# Patient Record
Sex: FEMALE | Race: WHITE | NOT HISPANIC OR LATINO | Employment: OTHER | URBAN - METROPOLITAN AREA
[De-identification: names, ages, dates, MRNs, and addresses within clinical notes are randomized per-mention and may not be internally consistent; named-entity substitution may affect disease eponyms.]

---

## 2017-01-02 ENCOUNTER — GENERIC CONVERSION - ENCOUNTER (OUTPATIENT)
Dept: OTHER | Facility: OTHER | Age: 77
End: 2017-01-02

## 2017-07-03 ENCOUNTER — GENERIC CONVERSION - ENCOUNTER (OUTPATIENT)
Dept: OTHER | Facility: OTHER | Age: 77
End: 2017-07-03

## 2017-07-10 ENCOUNTER — GENERIC CONVERSION - ENCOUNTER (OUTPATIENT)
Dept: OTHER | Facility: OTHER | Age: 77
End: 2017-07-10

## 2017-07-31 ENCOUNTER — GENERIC CONVERSION - ENCOUNTER (OUTPATIENT)
Dept: OTHER | Facility: OTHER | Age: 77
End: 2017-07-31

## 2017-08-04 ENCOUNTER — GENERIC CONVERSION - ENCOUNTER (OUTPATIENT)
Dept: OTHER | Facility: OTHER | Age: 77
End: 2017-08-04

## 2017-08-09 ENCOUNTER — ALLSCRIPTS OFFICE VISIT (OUTPATIENT)
Dept: OTHER | Facility: OTHER | Age: 77
End: 2017-08-09

## 2017-08-09 DIAGNOSIS — I10 ESSENTIAL (PRIMARY) HYPERTENSION: ICD-10-CM

## 2017-08-09 DIAGNOSIS — I42.0 DILATED CARDIOMYOPATHY (HCC): ICD-10-CM

## 2017-08-09 DIAGNOSIS — E78.00 PURE HYPERCHOLESTEROLEMIA: ICD-10-CM

## 2017-08-09 DIAGNOSIS — C90.00 MULTIPLE MYELOMA NOT HAVING ACHIEVED REMISSION (HCC): ICD-10-CM

## 2017-08-09 DIAGNOSIS — M81.0 AGE-RELATED OSTEOPOROSIS WITHOUT CURRENT PATHOLOGICAL FRACTURE: ICD-10-CM

## 2017-08-24 ENCOUNTER — ALLSCRIPTS OFFICE VISIT (OUTPATIENT)
Dept: OTHER | Facility: OTHER | Age: 77
End: 2017-08-24

## 2017-08-31 ENCOUNTER — GENERIC CONVERSION - ENCOUNTER (OUTPATIENT)
Dept: OTHER | Facility: OTHER | Age: 77
End: 2017-08-31

## 2017-09-06 ENCOUNTER — GENERIC CONVERSION - ENCOUNTER (OUTPATIENT)
Dept: OTHER | Facility: OTHER | Age: 77
End: 2017-09-06

## 2017-09-11 ENCOUNTER — GENERIC CONVERSION - ENCOUNTER (OUTPATIENT)
Dept: OTHER | Facility: OTHER | Age: 77
End: 2017-09-11

## 2017-10-17 ENCOUNTER — ALLSCRIPTS OFFICE VISIT (OUTPATIENT)
Dept: OTHER | Facility: OTHER | Age: 77
End: 2017-10-17

## 2018-01-10 NOTE — PROGRESS NOTES
History of Present Illness  Care Coordination Encounter Information:   Type of Encounter: Telephonic   Contact: Initial Contact    Spoke to Patient  Care Coordination SL Nurse [facility]:   The reason for call is to discuss outreach for follow up/needed services  Spoke with pt and she does decline further outreach  She had been inpt at [facility]  She is very knowledgeable about her various diagnoses and sees many specialists such as Oral Surgery, Hem Onc, ID, has VNA, PCP, etc  She is actively involved in her care  She is very pleasant and is currently infusing Antibiotics 3x a day through PICC for osteomyelitis as well as continues chemo for her multiple myeloma  Active Problems    1  Actinic keratoses (702 0) (L57 0)   2  Benign essential hypertension (401 1) (I10)   3  Cellulitis, face (682 0) (L03 211)   4  Chronic systolic heart failure, ACC/AHA stage C (428 22) (I50 22)   5  Dilated cardiomyopathy (425 4) (I42 0)   6  Encounter for current long-term use of anticoagulants (V58 61) (Z79 01)   7  Hypercholesterolemia (272 0) (E78 00)   8  Multiple myeloma (203 00) (C90 00)   9  TRACY (obstructive sleep apnea) (327 23) (G47 33)   10  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)   11  Osteoporosis (733 00) (M81 0)   12  Pulmonary embolism (415 19) (I26 99)    Past Medical History    1  History of Abdominal mass, RUQ (right upper quadrant) (789 31) (R19 01)   2  History of Abnormal electrocardiogram (794 31) (R94 31)   3  History of Acute gastric ulcer with hemorrhage (531 00) (K25 0)   4  History of Acute maxillary sinusitis (461 0) (J01 00)   5  History of Acute venous embolism and thrombosis of deep vessels of distal lower   extremity (453 42) (I82 4Z9)   6  History of Adenocarcinoma Of The Breast (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 3)   7  History of Breast Cancer (V10 3)   8  Denied: History of Carrier Of STD   9  History of Closed Fracture Of Two Left Ribs (807 02)   10  History of Depression screening (V79 0) (Z13 89)   11   History of Dyspnea (786 09) (R06 00)   12  History of Encounter for screening colonoscopy (V76 51) (Z12 11)   13  History of Encounter for screening mammogram for malignant neoplasm of breast    (V76 12) (Z12 31)   14  History of Glucose Intolerance (271 3)   15  History of Hematemesis/vomiting blood (578 0) (K92 0)   16  History of basal cell carcinoma (V10 83) (Z85 828)   17  History of congestive heart failure (V12 59) (Z86 79)   18  History of edema (V13 89) (Z87 898)   19  History of fatigue (V13 89) (Z87 898)   20  History of malignant neoplasm of skin (V10 83) (Z85 828)   21  History of type 2 diabetes mellitus (V12 29) (Z86 39)   22  History of Iatrogenic Pulmonary Embolism (415 11)   23  History of Lymphedema (457 1) (I89 0)   24  History of Mammogram abnormal (793 80) (R92 8)   25  History of Mental status change (780 97) (R41 82)   26  History of Need for prophylactic vaccination and inoculation against influenza (V04 81)    (Z23)   27  History of Obstructive sleep apnea (327 23) (G47 33)   28  History of Osteoarthritis of hip (715 95) (M16 9)   29  History of Osteoarthritis of knee (715 36) (M17 10)   30  History of Pulmonary Embolism (V12 55)   31  History of S/P IVC filter (V45 89) (Z95 828)   32  History of Skin Cancer (V10 83)   33  History of Tachycardia (785 0) (R00 0)   34  History of Visit For: Malig Neoplasm Vagina Post-hysterectomy Pap Smear (V67 01)   35  History of Vitamin D deficiency (268 9) (E55 9)    Surgical History    1  History of Biopsy Bone Marrow   2  History of Breast Surgery   3  History of Dilation And Curettage   4  History of Dilation And Curettage   5  History of Knee Surgery   6  History of Lymphadenectomy   7  History of Reported Hx Of Hip Replacement    Family History  Mother    1  Family history of No known health problems  Father    2  Family history of No known health problems  Brother    3  Family history of Hypertension (V17 49)  Maternal Aunt    4   Family history of Breast Cancer (V16 3)    Social History    · Denied: History of Alcohol Use (History)   · Daily Coffee Consumption (1  Cups/Day)   · Denied: History of Drug Use   · Denied: History of Exercise Habits   · Marital History - Currently    · Never A Smoker   · Occasional alcohol use   · Occupation: Retired    Current Meds    1  Lisinopril 5 MG Oral Tablet; take 1 tablet daily at bedtime; Last Rx:12Jan2015 Ordered    2  Simvastatin 40 MG Oral Tablet; TAKE 1 TABLET DAILY  Requested for: 44GLC4858; Last   Rx:46Bjs2955 Ordered    3  Dexamethasone 4 MG Oral Tablet; 5 tablets weekly on cho day; Therapy: 40Unr5279 to Recorded   4  Zometa 4 MG/100ML Intravenous Solution (Zoledronic Acid); monthly; Therapy: (Recorded:24Gzt4779) to Recorded    5  Carvedilol 6 25 MG Oral Tablet; TAKE 1 TABLET TWICE DAILY WITH MEALS  Requested   for: 11KVP1584; Last Rx:54Jzj1624 Ordered   6  Klor-Con M20 20 MEQ Oral Tablet Extended Release; TAKE 1 TABLET DAILY  Requested   for: 91IPO3398; Last Rx:87Lcy9362 Ordered    7  Vitamin D3 08413 UNIT Oral Capsule; Take 1 capsule, 1 x per week, for 12 weeks; Therapy: 34Joo3760 to (Last Rx:07Osr0301)  Requested for: 74NIN7171 Ordered    8  Eliquis 5 MG Oral Tablet; Take 1 tablet twice daily; Therapy: 93PBZ3564 to (Evaluate:16Mar2017)  Requested for: 97DSV7037; Last   Rx:80Jxi3994 Ordered    9  Acyclovir 400 MG Oral Tablet; Take 1 tablet daily; Therapy: 46LTL6254 to (03 17 74 30 53) Recorded   10  Famotidine 20 MG Oral Tablet; 1 tablet daily if needed; Therapy: 29NID0917 to Recorded   11  Furosemide 20 MG Oral Tablet; TAKE 1 TABLET DAILY AS DIRECTED; Therapy: (Recorded:50Ucg5956) to Recorded   12  LORazepam 0 5 MG Oral Tablet; TAKE 1 TABLET EVERY 6 HOURS AS NEEDED FOR    NAUSEA; Therapy: (Recorded:06Oct2015) to Recorded   13  Ondansetron 8 MG Oral Tablet Disintegrating; Therapy: 12Eci6754 to Recorded   14  Prochlorperazine Maleate 10 MG Oral Tablet; Therapy: 93Oaj6359 to Recorded   15  Revlimid 15 MG Oral Capsule; TAKE 1 CAPSULE DAILY; Therapy: 24JQH0281 to Recorded   16  Velcade 3 5 MG Injection Solution Reconstituted; weekly; Therapy: (Recorded:10Nov2016) to Recorded    Allergies    1  Latex Gloves MISC    2  Latex   3  Adhesive Tape    Health Management   *VB - Fall Risk Assessment  (Dx Z13 89 Screen for Neurologic Disorder); every 1 year; Last  82Vtt8882; Next Due: 85Dhj1512; Overdue   *VB - Urinary Incontinence Screen (Dx Z13 89 Screen for UI); every 1 year; Last 07Lht3854; Next  Due: 39Agl9188; Overdue  *VB-Depression Screening; every 1 year; Last 25Npl6044; Next Due: 15Sgt0097; Overdue   *VB - Fall Risk Assessment  (Dx Z13 89 Screen for Neurologic Disorder); every 1 year; Last  38Cvb8996; Next Due: 96Fes2001; Overdue   (1) BASIC METABOLIC PROFILE; every 3 months; Last 41ADK7547; Next Due: 01Ulx8047; Overdue  (1) HEMOGLOBIN A1C; every 3 months; Last 54EDI7452; Next Due: 35CTO7299; Overdue  (1) MICROALBUMIN CREATININE RATIO, RANDOM URINE; every 1 year; Next Due: 97YQH8936; Overdue  *VB - Eye Exam; every 1 year; Next Due: 75RUA8010; Overdue  *VB - Foot Exam; every 1 year; Last 95NUU3852; Next Due: U3819850; Overdue    End of Encounter Meds    1  Lisinopril 5 MG Oral Tablet; take 1 tablet daily at bedtime; Last Rx:12Jan2015 Ordered    2  Simvastatin 40 MG Oral Tablet; TAKE 1 TABLET DAILY  Requested for: 51XKC3112; Last   Rx:02Feb2016 Ordered    3  Dexamethasone 4 MG Oral Tablet; 5 tablets weekly on cho day; Therapy: 92Sbh0138 to Recorded   4  Zometa 4 MG/100ML Intravenous Solution (Zoledronic Acid); monthly; Therapy: (Recorded:66Avl5650) to Recorded    5  Carvedilol 6 25 MG Oral Tablet; TAKE 1 TABLET TWICE DAILY WITH MEALS  Requested   for: 02CQH9898; Last Rx:02Feb2016 Ordered   6  Klor-Con M20 20 MEQ Oral Tablet Extended Release; TAKE 1 TABLET DAILY  Requested   for: 57AAY2548; Last Rx:47Ftk9995 Ordered    7  Vitamin D3 20802 UNIT Oral Capsule;  Take 1 capsule, 1 x per week, for 12 weeks; Therapy: 03Cxp1155 to (Last Rx:53Btj9529)  Requested for: 08QXP3938 Ordered    8  Eliquis 5 MG Oral Tablet; Take 1 tablet twice daily; Therapy: 23PRE0766 to (Evaluate:16Mar2017)  Requested for: 01KNX6805; Last   Rx:05Qfa1811 Ordered    9  Acyclovir 400 MG Oral Tablet; Take 1 tablet daily; Therapy: 50VUK3088 to (566 324 313) Recorded   10  Famotidine 20 MG Oral Tablet; 1 tablet daily if needed; Therapy: 59EQT6664 to Recorded   11  Furosemide 20 MG Oral Tablet; TAKE 1 TABLET DAILY AS DIRECTED; Therapy: (Recorded:62Bwp2702) to Recorded   12  LORazepam 0 5 MG Oral Tablet; TAKE 1 TABLET EVERY 6 HOURS AS NEEDED FOR    NAUSEA; Therapy: (Recorded:28Ohu4863) to Recorded   13  Ondansetron 8 MG Oral Tablet Disintegrating; Therapy: 20Ytc7180 to Recorded   14  Prochlorperazine Maleate 10 MG Oral Tablet; Therapy: 98Vuz4085 to Recorded   15  Revlimid 15 MG Oral Capsule; TAKE 1 CAPSULE DAILY; Therapy: 90IJO1542 to Recorded   16  Velcade 3 5 MG Injection Solution Reconstituted; weekly;     Therapy: (Recorded:54Ayy2606) to Recorded    Future Appointments    Date/Time Provider Specialty Site   08/09/2017 03:00 PM Senthil Brice MD Internal Medicine [facility] AND [facility] 90     Message   Recorded as Task   Date: 08/03/2017 09:41 AM, Created By: Joseph Snyder   Task Name: Care Coordination   Assigned To: Joseph Snyder   Regarding Patient: Ashlee Pemberton, Status: Active   [place] [address] Aug 2017 9:41 AM     TASK CREATED  Left a message for call back for initial outreach   [facility] Aug 2017 10:52 AM     TASK EDITED  Left a second message for outreach     Patient Care Team    Care Team Member Role Specialty Office Number   [address] [place] [address] [address]  Hematology Oncology [phone]     Signatures   Electronically signed by : Levonne Libman, RN; Aug  4 2017  4:50PM EST                       (Author)

## 2018-01-10 NOTE — MISCELLANEOUS
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, C-diff colitis, multiple myeloma, DVT of bilat LEs, current long term use of anticoagulants, acute osteomyelitis of jaw  She was discharged to a rehabilitation center  Medications were not reviewed today  She did not schedule a follow up appointment  Communication performed and completed by kb      Active Problems    1  Actinic keratoses (702 0) (L57 0)   2  Anemia (285 9) (D64 9)   3  Benign essential hypertension (401 1) (I10)   4  C  difficile colitis (008 45) (A04 7)   5  Cerebrovascular accident (CVA) due to embolism of right middle cerebral artery (434 11)   (I63 411)   6  Chronic systolic heart failure, ACC/AHA stage C (428 22) (I50 22)   7  Dilated congestive cardiomyopathy (425 4) (I42 0)   8  DVT of lower extremity, bilateral (453 40) (I82 403)   9  Encounter for current long-term use of anticoagulants (V58 61) (Z79 01)   10  Encounter for special screening examination for genitourinary disorder (V81 6) (Z13 89)   11  Hypercholesterolemia (272 0) (E78 00)   12  Hypokalemia (276 8) (E87 6)   13  Multiple myeloma (203 00) (C90 00)   14  TRACY (obstructive sleep apnea) (327 23) (G47 33)   15  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)   16  Osteoporosis (733 00) (M81 0)   17  Pulmonary embolism (415 19) (I26 99)   18  TIA (transient ischemic attack) (435 9) (G45 9)    Past Medical History    1  History of Abdominal mass, RUQ (right upper quadrant) (789 31) (R19 01)   2  History of Abnormal electrocardiogram (794 31) (R94 31)   3  History of Acute gastric ulcer with hemorrhage (531 00) (K25 0)   4   History of Acute maxillary sinusitis (461 0) (J01 00)   5  History of Acute venous embolism and thrombosis of deep vessels of distal lower   extremity (453 42) (I82 4Z9)   6  History of Adenocarcinoma Of The Breast (V10 3)   7  History of Breast Cancer (V10 3)   8  Denied: History of Carrier Of STD   9  History of Cellulitis, face (682 0) (L03 211)   10  History of Closed Fracture Of Two Left Ribs (807 02)   11  History of Depression screening (V79 0) (Z13 89)   12  History of Dyspnea (786 09) (R06 00)   13  History of Encounter for screening colonoscopy (V76 51) (Z12 11)   14  History of Encounter for screening mammogram for malignant neoplasm of breast    (V76 12) (Z12 31)   15  History of Glucose Intolerance (271 3)   16  History of Hematemesis/vomiting blood (578 0) (K92 0)   17  History of basal cell carcinoma (V10 83) (Z85 828)   18  History of congestive heart failure (V12 59) (Z86 79)   19  History of edema (V13 89) (Z87 898)   20  History of fatigue (V13 89) (Z87 898)   21  History of malignant neoplasm of skin (V10 83) (Z85 828)   22  History of type 2 diabetes mellitus (V12 29) (Z86 39)   23  History of Iatrogenic Pulmonary Embolism (415 11)   24  History of Lymphedema (457 1) (I89 0)   25  History of Mammogram abnormal (793 80) (R92 8)   26  History of Mental status change (780 97) (R41 82)   27  History of Need for prophylactic vaccination and inoculation against influenza (V04 81)    (Z23)   28  History of Obstructive sleep apnea (327 23) (G47 33)   29  History of Osteoarthritis of hip (715 95) (M16 9)   30  History of Osteoarthritis of knee (715 36) (M17 10)   31  History of Pulmonary Embolism (V12 55)   32  History of S/P IVC filter (V45 89) (Z95 828)   33  History of Skin Cancer (V10 83)   34  History of Tachycardia (785 0) (R00 0)   35  History of Visit For: Malig Neoplasm Vagina Post-hysterectomy Pap Smear (V67 01)   36  History of Vitamin D deficiency (268 9) (E55 9)    Surgical History    1   History of Biopsy Bone Marrow   2  History of Breast Surgery   3  History of Dilation And Curettage   4  History of Dilation And Curettage   5  History of Knee Surgery   6  History of Lymphadenectomy   7  History of Reported Hx Of Hip Replacement    Family History  Mother    1  Family history of No known health problems  Father    2  Family history of No known health problems  Brother    3  Family history of Hypertension (V17 49)  Maternal Aunt    4  Family history of Breast Cancer (V16 3)    Social History    · Denied: History of Alcohol Use (History)   · Daily Coffee Consumption (1  Cups/Day)   · Denied: History of Drug Use   · Denied: History of Exercise Habits   · Marital History - Currently    · Never A Smoker   · Occasional alcohol use   · Occupation: Retired    Current Meds   1  Acyclovir 400 MG Oral Tablet; Take 1 tablet daily; Therapy: 74NVV0406 to (Evaluate:21Oct2015) Recorded   2  Carvedilol 6 25 MG Oral Tablet; TAKE 1 TABLET TWICE DAILY WITH MEALS  Requested   for: 29GQE6612; Last Rx:13Aep8803 Ordered   3  Dexamethasone 4 MG Oral Tablet; 5 tablets weekly on cho day; Therapy: 25Dbq7634 to Recorded   4  Eliquis 5 MG Oral Tablet; Take 1 tablet twice daily; Therapy: 75DJE4944 to (Evaluate:16Mar2017)  Requested for: 71NBB4952; Last   Rx:34Hpm8759 Ordered   5  Famotidine 20 MG Oral Tablet; 1 tablet daily if needed; Therapy: 71Ffg8757 to Recorded   6  Furosemide 20 MG Oral Tablet; TAKE 1 TABLET DAILY AS DIRECTED; Therapy: (Recorded:42Ems7775) to Recorded   7  Klor-Con M20 20 MEQ Oral Tablet Extended Release; TAKE 1 TABLET DAILY  Requested   for: 48GTQ6416; Last Rx:69Ykx1204 Ordered   8  Lisinopril 5 MG Oral Tablet; take 1 tablet daily at bedtime; Last Rx:12Jan2015 Ordered   9  LORazepam 0 5 MG Oral Tablet; TAKE 1 TABLET EVERY 6 HOURS AS NEEDED FOR   NAUSEA; Therapy: (Recorded:06Oct2015) to Recorded   10  Ondansetron 8 MG Oral Tablet Disintegrating; Therapy: 15Sep2015 to Recorded   11   Prochlorperazine Maleate 10 MG Oral Tablet; Therapy: 74Rgq8558 to Recorded   12  Revlimid 15 MG Oral Capsule; TAKE 1 CAPSULE DAILY; Therapy: 40NXE4514 to Recorded   13  Simvastatin 40 MG Oral Tablet; TAKE 1 TABLET DAILY  Requested for: 19BRS7579; Last    Rx:83Wjd9066 Ordered   14  Velcade 3 5 MG Injection Solution Reconstituted; weekly; Therapy: (Recorded:70Ohv1037) to Recorded   15  Vitamin D3 16488 UNIT Oral Capsule; Take 1 capsule, 1 x per week, for 12 weeks; Therapy: 47Vnn0535 to (Last Rx:08Qqa4701)  Requested for: 05ZFQ2375 Ordered    Allergies    1  Latex Gloves MISC    2  Latex   3  Adhesive Tape    Results/Data  ECG 12-LEAD 69Ozl5868 12:00AM      Test Name Result Flag Reference   ECG 12-LEAD 8/31/17         Health Management  History of Abdominal mass, RUQ (right upper quadrant)   *VB - Fall Risk Assessment  (Dx Z13 89 Screen for Neurologic Disorder); every 1 year; Last  74Dtt3516; Next Due: 38Nyc4758; Overdue  History of Depression screening   *VB - Urinary Incontinence Screen (Dx Z13 89 Screen for UI); every 1 year; Last 30Bns8737; Next  Due: 97Fhp5282; Active  *VB-Depression Screening; every 1 year; Last 77Imo6123; Next Due: 53Qmk5987; Overdue  History of Encounter for special screening examination for nervous system disorder   *VB - Fall Risk Assessment  (Dx Z13 89 Screen for Neurologic Disorder); every 1 year; Last  79Shz2656; Next Due: 88Cca1963; Overdue  History of type 2 diabetes mellitus   (1) BASIC METABOLIC PROFILE; every 3 months; Last 24Aiu3610; Next Due: 47QOQ7681; Active  (1) HEMOGLOBIN A1C; every 3 months; Last 73NPL6656; Next Due: 97KGO0906; Overdue  (1) MICROALBUMIN CREATININE RATIO, RANDOM URINE; every 1 year; Next Due: 49RXC3335; Overdue  *VB - Eye Exam; every 1 year; Next Due: 96SNK4582; Overdue  *VB - Foot Exam; every 1 year; Last 30TDJ3992; Next Due: W1483424;  Overdue    Future Appointments    Date/Time Provider Specialty Site   10/17/2017 02:15 PM Mignon Ortega DO Internal Medicine  [facility]   01/16/2018 09:30 AM Floyd Longo DO Internal Medicine [facility]     Signatures   Electronically signed by : Orestes Mir, ; Sep  6 2017  2:23PM EST                       (Co-author)    Electronically signed by : Sanjeev Camarillo DO; Sep  7 2017  9:31AM EST

## 2018-01-11 NOTE — RESULT NOTES
Verified Results  (1) VITAMIN D 25-HYDROXY 27LDD8128 07:19AM Ivette Pozo   TW Order Number: [id number]     Test Name Result Flag Reference   VIT D 25-HYDROX 39 3 ng/mL  30 0-100 0   This assay is a certified procedure of the CDC Vitamin D Standardization Certification Program (VDSCP)     Deficiency <20ng/ml   Insufficiency 20-30ng/ml   Sufficient  ng/ml     *Patients undergoing fluorescein dye angiography may retain small amounts of fluorescein in the body for 48-72 hours post procedure  Samples containing fluorescein can produce falsely elevated Vitamin D values  If the patient had this procedure, a specimen should be resubmitted post fluorescein clearance  (1) LIPID PANEL, FASTING 30UFK6105 07:19AM Jacked Order Number: [id number]     Test Name Result Flag Reference   CHOLESTEROL 194 mg/dL     HDL,DIRECT 122 mg/dL H 40-60   Specimen collection should occur prior to Metamizole administration due to the potential for falsely depressed results  LDL CHOLESTEROL CALCULATED 62 mg/dL  0-100   Triglyceride:         Normal              <150 mg/dl       Borderline High    150-199 mg/dl       High               200-499 mg/dl       Very High          >499 mg/dl  Cholesterol:         Desirable        <200 mg/dl      Borderline High  200-239 mg/dl      High             >239 mg/dl  HDL Cholesterol:        High    >59 mg/dL      Low     <41 mg/dL  LDL CALCULATED:    This screening LDL is a calculated result  It does not have the accuracy of the Direct Measured LDL in the monitoring of patients with hyperlipidemia and/or statin therapy  Direct Measure LDL (SOI125) must be ordered separately in these patients  TRIGLYCERIDES 50 mg/dL  <=150   Specimen collection should occur prior to N-Acetylcysteine or Metamizole administration due to the potential for falsely depressed results       (1) CK (CPK) 20LYT7819 07:19AM Jacked Order Number: [id number]     Test Name Result Flag Reference CK (CPK) 60 U/L       (1) NT- BNP (PRO BRAIN NATRIURETIC PEPTIDE) 05MPN5701 07:19AM Catie Holder     Test Name Result Flag Reference   NT-PRO BNP 3485 pg/mL H <450     (1) COMPREHENSIVE METABOLIC PANEL 96DXW2730 71:50HA Catie Holder   TW Order Number: [id number]     Test Name Result Flag Reference   GLUCOSE,RANDM 114 mg/dL     If the patient is fasting, the ADA then defines impaired fasting glucose as > 100 mg/dL and diabetes as > or equal to 123 mg/dL  SODIUM 141 mmol/L  136-145   POTASSIUM 4 0 mmol/L  3 5-5 3   CHLORIDE 104 mmol/L  100-108   CARBON DIOXIDE 30 mmol/L  21-32   ANION GAP (CALC) 7 mmol/L  4-13   BLOOD UREA NITROGEN 22 mg/dL  5-25   CREATININE 1 25 mg/dL  0 60-1 30   Standardized to IDMS reference method   CALCIUM 9 1 mg/dL  8 3-10 1   BILI, TOTAL 0 28 mg/dL  0 20-1 00   ALK PHOSPHATAS 60 U/L     ALT (SGPT) 17 U/L  12-78   AST(SGOT) 17 U/L  5-45   ALBUMIN 3 4 g/dL L 3 5-5 0   TOTAL PROTEIN 6 6 g/dL  6 4-8 2   eGFR Non-African American 41 6 ml/min/1 73sq m     [facility] Disease Education Program recommendations are as follows:  GFR calculation is accurate only with a steady state creatinine  Chronic Kidney disease less than 60 ml/min/1 73 sq  meters  Kidney failure less than 15 ml/min/1 73 sq  meters

## 2018-01-12 NOTE — MISCELLANEOUS
Provider Comments  Provider Comments:   Left message on machine for patient to call back to reschedule no show appointment        Signatures   Electronically signed by : Reggie Abarca DO; Oct 17 2017  3:09PM EST

## 2018-01-14 VITALS
SYSTOLIC BLOOD PRESSURE: 140 MMHG | DIASTOLIC BLOOD PRESSURE: 80 MMHG | TEMPERATURE: 97.9 F | HEART RATE: 96 BPM | HEIGHT: 58 IN | WEIGHT: 125.4 LBS | OXYGEN SATURATION: 96 % | BODY MASS INDEX: 26.32 KG/M2

## 2018-01-16 ENCOUNTER — ALLSCRIPTS OFFICE VISIT (OUTPATIENT)
Dept: OTHER | Facility: OTHER | Age: 78
End: 2018-01-16

## 2018-01-16 DIAGNOSIS — M81.0 AGE-RELATED OSTEOPOROSIS WITHOUT CURRENT PATHOLOGICAL FRACTURE: ICD-10-CM

## 2018-01-16 DIAGNOSIS — E78.00 PURE HYPERCHOLESTEROLEMIA: ICD-10-CM

## 2018-01-17 NOTE — MISCELLANEOUS
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)   3  Multiple myeloma (203 00) (C90 00)   4  Chronic systolic heart failure, ACC/AHA stage C (428 22) (I50 22)   5  Benign essential hypertension (401 1) (I10)    Discussion/Summary  Discussion Summary:   She has not seen cardio in 3 years  schedule f/up with dr Tan Peres and echo   need to f/u on Aortic valve      get Iron panel, due to repeated blood draws may need venofer  Counseling Documentation With Imm: The patient was counseled regarding  Medication SE Review and Pt Understands Tx: Possible side effects of new medications were reviewed with the patient/guardian today  The treatment plan was reviewed with the patient/guardian  The patient/guardian understands and agrees with the treatment plan      Chief Complaint  Chief Complaint Free Text Note Form: Patient is here for a SHADIA visit after hospitalization at [address] [place] [address] 7/27/2017 to 7/31/2017 for cellulitis of face and osteomyelitis of jaw  She is presently on IV antibiotics and had labs on 8/7/17 by the Giovani ROSSI  History of Present Illness  TCM Communication [facility]: The patient is being contacted for follow-up after hospitalization and SHADIA scheduled 8/9/17 w/ [facility] in [facility]  She was hospitalized at [facility]  The date of admission: 7/27/17, date of discharge: 7/31/17  Diagnosis: cellulitis of face, osteomyelitis of jaw, chronic systolic heart failure, HTN, multiple myeloma, Hx breast Ca, Hx basal cell carcinoma excision, HLD, current long term use of anticoagulants, TRACY, PICC  She was discharged to home  Medications reviewed and updated today  She scheduled a follow up appointment  The patient is currently asymptomatic  Communication performed and completed by robin Negron (Brief): The patient is being seen for follow-up of a recent hospitalization for osteomyelitis  This is located in the left  Symptoms:  swelling, redness and draining wound   The patient is currently experiencing symptoms  Associated symptoms:  malaise  Current treatment includes IV antibiotics  By report, there is fair symptom control  Multiple Myeloma: Initial presentation was 4 month(s) ago  Presentation included pathological fracture  Current diagnosis was determined by bone marrow biopsy  Past evaluation has included complete blood count  Past treatment has included bortezomib (Velcade) and radiation therapy  Cellulitis, Facial (Brief): The patient is being seen for follow-up of a recent hospitalization for facial cellulitis  Symptoms:  redness and swelling, but no localized warmth, no fever and no chills  The patient is currently experiencing symptoms  Symptoms are located in the left lower face  Onset followed a dental infection  She describes this as improving  Associated symptoms:  odynophagia  Current treatment includes IV abx  By report, there is fair symptom control  Hypertension (Follow-Up): The patient states she has been stable with her blood pressure control since the last visit  Comorbid Illnesses: cardiac failure  Interval Events: none  She has no significant interval events  Symptoms: denies impaired vision, denies dyspnea, denies chest pain, denies intermittent leg claudication and improved lower extremity edema  Associated symptoms include no headache, no focal neurologic deficits and no memory loss  Home monitoring: The patient is not checking blood pressure at home  Medications: the patient is not adherent with her medication regimen  She denies medication side effects  The patient is due for a lipid panel, a serum creatinine and an eye exam    Congestive Heart Failure (Follow-Up): The patient presents for follow-up of systolic heart failure  The patient is NYHA functional Class II  The patient states she has been stable with her heart failure symptoms since the last visit  Symptoms:   Hyperlipidemia (Follow-Up):  The patient states her hyperlipidemia has been under good control since the last visit  Comorbid Illnesses: hypertension  Interval Events: hospitalization for multiple myeloma/pathologic L tibial fracture  Symptoms:      Review of Systems  Complete-Female:   Constitutional: recent 50 lb due to chemo lb weight loss, but no fever, not feeling poorly, no chills and not feeling tired  Eyes: no eye pain, no eyesight problems and no purulent discharge from the eyes  ENT: no earache, no sore throat and no nasal discharge    The patient presents with complaints of no nosebleeds (occasionally, able to control)  Cardiovascular: lower extremity edema, but no chest pain, no intermittent leg claudication and no palpitations  Respiratory: No complaints of shortness of breath, no wheezing, no cough, no SOB on exertion, no orthopnea, no PND, no shortness of breath, no cough, no orthopnea and no wheezing  Gastrointestinal: constipation and diarrhea, but no abdominal pain, no nausea, no vomiting and no blood in stools  Genitourinary: incontinence and urge, but no dysuria  Musculoskeletal: limb swelling, but no arthralgias, no joint swelling, no limb pain and no myalgias  Integumentary: no rashes  Neurological: no headache, no numbness, no tingling, no confusion, no dizziness and no fainting  Psychiatric: sleep disturbances, but no emotional problems  Endocrine: no hot flashes  Hematologic/Lymphatic: a tendency for easy bruising, but no swollen glands and no tendency for easy bleeding  Active Problems    1  Actinic keratoses (702 0) (L57 0)   2  Benign essential hypertension (401 1) (I10)   3  Cellulitis, face (682 0) (L03 211)   4  Chronic systolic heart failure, ACC/AHA stage C (428 22) (I50 22)   5  Dilated cardiomyopathy (425 4) (I42 0)   6  Encounter for current long-term use of anticoagulants (V58 61) (Z79 01)   7  Hypercholesterolemia (272 0) (E78 00)   8  Multiple myeloma (203 00) (C90 00)   9   TRACY (obstructive sleep apnea) (327 23) (G47 33)   10  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)   11  Osteoporosis (733 00) (M81 0)   12  Pulmonary embolism (415 19) (I26 99)    Past Medical History    1  History of Abdominal mass, RUQ (right upper quadrant) (789 31) (R19 01)   2  History of Abnormal electrocardiogram (794 31) (R94 31)   3  History of Acute gastric ulcer with hemorrhage (531 00) (K25 0)   4  History of Acute maxillary sinusitis (461 0) (J01 00)   5  History of Acute venous embolism and thrombosis of deep vessels of distal lower   extremity (453 42) (I82 4Z9)   6  History of Adenocarcinoma Of The Breast (V10 3)   7  History of Breast Cancer (V10 3)   8  Denied: History of Carrier Of STD   9  History of Closed Fracture Of Two Left Ribs (807 02)   10  History of Depression screening (V79 0) (Z13 89)   11  History of Dyspnea (786 09) (R06 00)   12  History of Encounter for screening colonoscopy (V76 51) (Z12 11)   13  History of Encounter for screening mammogram for malignant neoplasm of breast    (V76 12) (Z12 31)   14  History of Glucose Intolerance (271 3)   15  History of Hematemesis/vomiting blood (578 0) (K92 0)   16  History of basal cell carcinoma (V10 83) (Z85 828)   17  History of congestive heart failure (V12 59) (Z86 79)   18  History of edema (V13 89) (Z87 898)   19  History of fatigue (V13 89) (Z87 898)   20  History of malignant neoplasm of skin (V10 83) (Z85 828)   21  History of type 2 diabetes mellitus (V12 29) (Z86 39)   22  History of Iatrogenic Pulmonary Embolism (415 11)   23  History of Lymphedema (457 1) (I89 0)   24  History of Mammogram abnormal (793 80) (R92 8)   25  History of Mental status change (780 97) (R41 82)   26  History of Need for prophylactic vaccination and inoculation against influenza (V04 81)    (Z23)   27  History of Obstructive sleep apnea (327 23) (G47 33)   28  History of Osteoarthritis of hip (715 95) (M16 9)   29  History of Osteoarthritis of knee (715 36) (M17 10)   30   History of Pulmonary Embolism (V12 55)   31  History of S/P IVC filter (V45 89) (Z95 828)   32  History of Skin Cancer (V10 83)   33  History of Tachycardia (785 0) (R00 0)   34  History of Visit For: Malig Neoplasm Vagina Post-hysterectomy Pap Smear (V67 01)   35  History of Vitamin D deficiency (268 9) (E55 9)    Surgical History    1  History of Biopsy Bone Marrow   2  History of Breast Surgery   3  History of Dilation And Curettage   4  History of Dilation And Curettage   5  History of Knee Surgery   6  History of Lymphadenectomy   7  History of Reported Hx Of Hip Replacement  Surgical History Reviewed: The surgical history was reviewed and updated today  Family History  Mother    1  Family history of No known health problems  Father    2  Family history of No known health problems  Brother    3  Family history of Hypertension (V17 49)  Maternal Aunt    4  Family history of Breast Cancer (V16 3)  Family History Reviewed: The family history was reviewed and updated today  Social History    · Denied: History of Alcohol Use (History)   · Daily Coffee Consumption (1  Cups/Day)   · Denied: History of Drug Use   · Denied: History of Exercise Habits   · Marital History - Currently    · Never A Smoker   · Occasional alcohol use   · Occupation: Retired  Social History Reviewed: The social history was reviewed and is unchanged  Current Meds   1  Acyclovir 400 MG Oral Tablet; Take 1 tablet daily; Therapy: 74ZHD5571 to (Evaluate:21Oct2015) Recorded   2  Carvedilol 6 25 MG Oral Tablet; TAKE 1 TABLET TWICE DAILY WITH MEALS  Requested   for: 74JGQ1646; Last Rx:94Fav4795 Ordered   3  Dexamethasone 4 MG Oral Tablet; 5 tablets weekly on cho day; Therapy: 22Whm0308 to Recorded   4  Eliquis 5 MG Oral Tablet; Take 1 tablet twice daily; Therapy: 41ZLC7713 to (Evaluate:16Mar2017)  Requested for: 22MYS2039; Last   Rx:36Tnk7393 Ordered   5  Famotidine 20 MG Oral Tablet; 1 tablet daily if needed;    Therapy: 09Cag3268 to Recorded   6  Furosemide 20 MG Oral Tablet; TAKE 1 TABLET DAILY AS DIRECTED; Therapy: (Recorded:57Lly5255) to Recorded   7  Klor-Con M20 20 MEQ Oral Tablet Extended Release; TAKE 1 TABLET DAILY  Requested   for: 86WOE5481; Last Rx:91Gwk3775 Ordered   8  Lisinopril 5 MG Oral Tablet; take 1 tablet daily at bedtime; Last Rx:07Llw1891 Ordered   9  LORazepam 0 5 MG Oral Tablet; TAKE 1 TABLET EVERY 6 HOURS AS NEEDED FOR   NAUSEA; Therapy: (Recorded:34Wnj3974) to Recorded   10  Ondansetron 8 MG Oral Tablet Disintegrating; Therapy: 50Gph4464 to Recorded   11  Prochlorperazine Maleate 10 MG Oral Tablet; Therapy: 08Hdq3081 to Recorded   12  Revlimid 15 MG Oral Capsule; TAKE 1 CAPSULE DAILY; Therapy: 95SSM4780 to Recorded   13  Simvastatin 40 MG Oral Tablet; TAKE 1 TABLET DAILY  Requested for: 46GOZ1516; Last    Rx:79Sos3253 Ordered   14  Velcade 3 5 MG Injection Solution Reconstituted; weekly; Therapy: (Recorded:10Nov2016) to Recorded   15  Vitamin D3 76453 UNIT Oral Capsule; Take 1 capsule, 1 x per week, for 12 weeks; Therapy: 76Xxl7841 to (Last Rx:60Vcj3139)  Requested for: 49WFQ4343 Ordered   16  Zometa 4 MG/100ML Intravenous Solution; monthly; Therapy: (Recorded:34Ome8981) to Recorded  Medication List Reviewed: The medication list was reviewed and updated today  Allergies    1  Latex Gloves MISC    2  Latex   3  Adhesive Tape    Physical Exam    Constitutional   General appearance: No acute distress, well appearing and well nourished  Eyes   Conjunctiva and lids: No swelling, erythema or discharge  Pupils and irises: Equal, round and reactive to light  Ears, Nose, Mouth, and Throat   External inspection of ears and nose: Normal     Oropharynx: Normal with no erythema, edema, exudate or lesions  Pulmonary   Respiratory effort: No increased work of breathing or signs of respiratory distress  Auscultation of lungs: Clear to auscultation      Cardiovascular   Auscultation of heart: Abnormal   The heart rate was normal  The rhythm was regular  A grade 4 systolic murmur was heard at the LUSB  Examination of extremities for edema and/or varicosities: Abnormal   bilateral ankle 1+ pitting edema and bilateral pretibial 1+ pitting edema  R > L , not new, + 2, lymphedema RUE  Carotid pulses: Normal     Abdomen   Abdomen: Non-tender, no masses  Liver and spleen: No hepatomegaly or splenomegaly  Lymphatic   Palpation of lymph nodes in neck: No lymphadenopathy  +lymphedema right upper extremity  Musculoskeletal   Gait and station: Normal   with cane  Digits and nails: Normal without clubbing or cyanosis  Inspection/palpation of joints, bones, and muscles: Normal     Skin   Skin and subcutaneous tissue: Abnormal   skin color changes b/l LE  Neurologic no focal neuro deficit  Psychiatric   Orientation to person, place, and time: Normal     Mood and affect: Normal       Head and Face: Skin: skin lesions were seen on the face  There was swelling and tenderness of the left mandible  Health Management  History of Abdominal mass, RUQ (right upper quadrant)   *VB - Fall Risk Assessment  (Dx Z13 89 Screen for Neurologic Disorder); every 1 year; Last  48Olq3653; Next Due: 25Dnd8581; Overdue  History of Depression screening   *VB - Urinary Incontinence Screen (Dx Z13 89 Screen for UI); every 1 year; Last 70Ewf9726; Next  Due: 15Gzz5206; Overdue  *VB-Depression Screening; every 1 year; Last 35Amv2190; Next Due: 22Shh3796; Overdue  History of Encounter for special screening examination for nervous system disorder   *VB - Fall Risk Assessment  (Dx Z13 89 Screen for Neurologic Disorder); every 1 year; Last  81Ugw6054; Next Due: 08Hsz9772; Overdue  History of type 2 diabetes mellitus   (1) BASIC METABOLIC PROFILE; every 3 months; Last 40CPF2169; Next Due: 52Ard1791; Overdue  (1) HEMOGLOBIN A1C; every 3 months; Last 80XYW9889; Next Due: 01JKR1804;  Overdue  (1) MICROALBUMIN CREATININE RATIO, RANDOM URINE; every 1 year; Next Due: 03URB5104; Overdue  *VB - Eye Exam; every 1 year; Next Due: 72TUH7131; Overdue  *VB - Foot Exam; every 1 year; Last 42FUP1302; Next Due: F2447393;  Overdue    Signatures   Electronically signed by : Tor Alexis, ; Aug  2 2017  1:58PM EST                       (Co-author)    Electronically signed by : Garland Mantilla MD; Aug  9 2017  4:40PM EST                       (Author)

## 2018-01-18 NOTE — RESULT NOTES
Verified Results  (1) PT WITH INR 84Zoa8725 07:52AM Linda Garces   TW Order Number: [id number]  TW Order Number: [id number]     Test Name Result Flag Reference   INR 0 98  0 86-1 16   Performing Comments: Draw PRN    Performing Comments: Draw PRN      Performing Comments: Draw PRNPerforming Comments: Draw PRN   PT 13 1 seconds  12 0-14 3       Plan  Pulmonary embolism    · Eliquis 5 MG Oral Tablet;  Take 1 tablet twice daily

## 2018-01-22 VITALS
WEIGHT: 132.13 LBS | OXYGEN SATURATION: 95 % | DIASTOLIC BLOOD PRESSURE: 68 MMHG | HEIGHT: 58 IN | TEMPERATURE: 99.4 F | BODY MASS INDEX: 27.73 KG/M2 | SYSTOLIC BLOOD PRESSURE: 132 MMHG | HEART RATE: 95 BPM

## 2018-01-22 VITALS
HEART RATE: 93 BPM | HEIGHT: 58 IN | SYSTOLIC BLOOD PRESSURE: 158 MMHG | WEIGHT: 128.5 LBS | OXYGEN SATURATION: 97 % | TEMPERATURE: 98.3 F | BODY MASS INDEX: 26.97 KG/M2 | DIASTOLIC BLOOD PRESSURE: 98 MMHG

## 2018-03-26 DIAGNOSIS — Z86.73 HISTORY OF CVA (CEREBROVASCULAR ACCIDENT): ICD-10-CM

## 2018-03-26 DIAGNOSIS — Z86.711 HISTORY OF PULMONARY EMBOLUS (PE): Primary | ICD-10-CM

## 2018-05-14 RX ORDER — LISINOPRIL 5 MG/1
5 TABLET ORAL DAILY
COMMUNITY
Start: 2013-06-25 | End: 2019-01-02 | Stop reason: SDUPTHER

## 2018-05-14 RX ORDER — CHOLECALCIFEROL (VITAMIN D3) 1250 MCG
1 CAPSULE ORAL WEEKLY
COMMUNITY
Start: 2014-09-19

## 2018-05-14 RX ORDER — ONDANSETRON 8 MG/1
8 TABLET, ORALLY DISINTEGRATING ORAL AS NEEDED
COMMUNITY
Start: 2015-09-15 | End: 2019-08-05 | Stop reason: SDUPTHER

## 2018-05-14 RX ORDER — FUROSEMIDE 40 MG/1
40 TABLET ORAL 2 TIMES DAILY
COMMUNITY

## 2018-05-14 RX ORDER — SIMVASTATIN 40 MG
40 TABLET ORAL
COMMUNITY
Start: 2010-06-07 | End: 2019-01-16 | Stop reason: SDUPTHER

## 2018-05-14 RX ORDER — CARVEDILOL 6.25 MG/1
1 TABLET ORAL 2 TIMES DAILY
COMMUNITY
End: 2018-12-18 | Stop reason: HOSPADM

## 2018-05-14 RX ORDER — BORTEZOMIB 3.5 MG/1
INJECTION, POWDER, LYOPHILIZED, FOR SOLUTION INTRAVENOUS; SUBCUTANEOUS
COMMUNITY
End: 2018-05-15

## 2018-05-14 RX ORDER — SENNA AND DOCUSATE SODIUM 50; 8.6 MG/1; MG/1
1 TABLET, FILM COATED ORAL AS NEEDED
COMMUNITY
Start: 2018-05-08

## 2018-05-14 RX ORDER — POTASSIUM CHLORIDE 20 MEQ/1
1 TABLET, EXTENDED RELEASE ORAL EVERY OTHER DAY
COMMUNITY
End: 2018-12-19 | Stop reason: DRUGHIGH

## 2018-05-14 RX ORDER — FAMOTIDINE 20 MG/1
TABLET, FILM COATED ORAL
COMMUNITY
Start: 2015-07-20 | End: 2018-05-15

## 2018-05-14 RX ORDER — LORAZEPAM 0.5 MG/1
1 TABLET ORAL EVERY 6 HOURS PRN
COMMUNITY

## 2018-05-14 RX ORDER — PROCHLORPERAZINE MALEATE 10 MG
10 TABLET ORAL AS NEEDED
COMMUNITY
Start: 2015-09-15

## 2018-05-15 ENCOUNTER — OFFICE VISIT (OUTPATIENT)
Dept: INTERNAL MEDICINE CLINIC | Age: 78
End: 2018-05-15
Payer: MEDICARE

## 2018-05-15 VITALS
WEIGHT: 122.4 LBS | OXYGEN SATURATION: 95 % | TEMPERATURE: 100 F | SYSTOLIC BLOOD PRESSURE: 134 MMHG | DIASTOLIC BLOOD PRESSURE: 66 MMHG | HEIGHT: 57 IN | BODY MASS INDEX: 26.41 KG/M2 | HEART RATE: 63 BPM

## 2018-05-15 DIAGNOSIS — I50.43 ACUTE ON CHRONIC COMBINED SYSTOLIC AND DIASTOLIC CONGESTIVE HEART FAILURE (HCC): ICD-10-CM

## 2018-05-15 DIAGNOSIS — J40 BRONCHITIS: Primary | ICD-10-CM

## 2018-05-15 PROBLEM — C90.00 MULTIPLE MYELOMA NOT HAVING ACHIEVED REMISSION (HCC): Status: ACTIVE | Noted: 2018-05-15

## 2018-05-15 PROBLEM — I50.22 CHRONIC SYSTOLIC HEART FAILURE, ACC/AHA STAGE C (HCC): Status: ACTIVE | Noted: 2017-08-02

## 2018-05-15 PROBLEM — I63.411 CEREBROVASCULAR ACCIDENT (CVA) DUE TO EMBOLISM OF RIGHT MIDDLE CEREBRAL ARTERY (HCC): Status: ACTIVE | Noted: 2017-09-06

## 2018-05-15 PROBLEM — Z79.01 CHRONIC ANTICOAGULATION: Status: ACTIVE | Noted: 2018-03-15

## 2018-05-15 PROBLEM — I50.9 CONGESTIVE HEART FAILURE (CHF) (HCC): Status: ACTIVE | Noted: 2018-05-15

## 2018-05-15 PROBLEM — M87.180 DRUG-INDUCED OSTEONECROSIS OF JAW (HCC): Status: ACTIVE | Noted: 2017-10-04

## 2018-05-15 PROBLEM — T45.1X5A ANEMIA ASSOCIATED WITH CHEMOTHERAPY: Status: ACTIVE | Noted: 2017-12-13

## 2018-05-15 PROBLEM — D64.9 ANEMIA: Status: ACTIVE | Noted: 2017-09-06

## 2018-05-15 PROBLEM — K21.9 ACID REFLUX: Status: ACTIVE | Noted: 2017-09-13

## 2018-05-15 PROBLEM — G45.9 TIA (TRANSIENT ISCHEMIC ATTACK): Status: ACTIVE | Noted: 2017-09-06

## 2018-05-15 PROBLEM — E87.6 HYPOKALEMIA: Status: ACTIVE | Noted: 2017-09-06

## 2018-05-15 PROBLEM — R55 SYNCOPE: Status: ACTIVE | Noted: 2018-05-03

## 2018-05-15 PROBLEM — M15.9 GENERALIZED OSTEOARTHRITIS: Status: ACTIVE | Noted: 2018-05-15

## 2018-05-15 PROBLEM — D64.81 ANEMIA ASSOCIATED WITH CHEMOTHERAPY: Status: ACTIVE | Noted: 2017-12-13

## 2018-05-15 PROBLEM — Z85.3 HISTORY OF BREAST CANCER IN FEMALE: Status: ACTIVE | Noted: 2017-11-22

## 2018-05-15 PROBLEM — I82.403 DVT OF LOWER EXTREMITY, BILATERAL (HCC): Status: ACTIVE | Noted: 2017-09-06

## 2018-05-15 PROBLEM — F41.9 ANXIETY: Status: ACTIVE | Noted: 2017-09-14

## 2018-05-15 PROCEDURE — 99214 OFFICE O/P EST MOD 30 MIN: CPT | Performed by: NURSE PRACTITIONER

## 2018-05-15 RX ORDER — AZITHROMYCIN 250 MG/1
TABLET, FILM COATED ORAL
Qty: 6 TABLET | Refills: 0 | Status: SHIPPED | OUTPATIENT
Start: 2018-05-15 | End: 2018-05-20

## 2018-05-15 RX ORDER — PROMETHAZINE HYDROCHLORIDE AND CODEINE PHOSPHATE 6.25; 1 MG/5ML; MG/5ML
5 SYRUP ORAL
Qty: 120 ML | Refills: 0 | Status: SHIPPED | OUTPATIENT
Start: 2018-05-15 | End: 2018-07-24 | Stop reason: ALTCHOICE

## 2018-05-15 NOTE — PATIENT INSTRUCTIONS
Heart Failure: You need to follow-up with cardiology  Continue with low sodium diet  Continue to [employer] your self  Recommend compression stockings during the day  New prescription given  Bronchitis:  Will start zithromax due to recent hospitalization  Use phenergan with codeine at night for cough  Heart Failure   AMBULATORY CARE:   Heart failure (HF) is a condition that does not allow your heart to fill or pump properly  Not enough oxygen in your blood gets to your organs and tissues  HF can occur in the right side, the left side, or both lower chambers of your heart  HF is often caused by damage or injury to your heart  The damage may be caused by heart attack, other heart conditions, or high blood pressure  HF is a long-term condition that tends to get worse over time  It is important to manage your health to improve your quality of life  HF can be worsened by heavy alcohol use, smoking, diabetes that is not controlled, or obesity     Common signs and symptoms:   · Difficulty breathing with activity that worsens to difficulty breathing at rest    · Shortness of breath while lying flat    · Severe shortness of breath and coughing at night that usually wakes you     · Chest pain at night    · Periods of no breathing, then breathing fast    · Fatigue or lack of energy (often worsened by physical activity)     · Swelling in your ankles, legs, or abdomen    · Fast heartbeat, purple color around your mouth and nailbeds    · Fingers and toes cool to the touch  Call 911 if:   · You have any of the following signs of a heart attack:      ¨ Squeezing, pressure, or pain in your chest that lasts longer than 5 minutes or returns    ¨ Discomfort or pain in your back, neck, jaw, stomach, or arm     ¨ Trouble breathing    ¨ Nausea or vomiting    ¨ Lightheadedness or a sudden cold sweat, especially with chest pain or trouble breathing    Seek care immediately if:   · You gain 3 or more pounds (1 4 kg) in a day, or more than your healthcare provider says you should  · Your heartbeat is fast, slow, or uneven all the time  Contact your healthcare provider if:   · You have symptoms of worsening HF:      ¨ Shortness of breath at rest, at night, or that is getting worse in any way     ¨ Weight gain of 5 or more pounds (2 2 kg) in a week     ¨ More swelling in your legs or ankles     ¨ Abdominal pain or swelling     ¨ More coughing     ¨ Loss of appetite     ¨ Feeling tired all the time    · You feel hopeless or depressed, or you have lost interest in things you used to enjoy  · You often feel worried or afraid  · You have questions or concerns about your condition or care  Treatment for HF  may include any of the following:  · Medicines  may be needed to help regulate your heart rhythm  You may also need medicines to lower your blood pressure, and to get rid of extra fluids  · Oxygen  may help you breathe easier if your oxygen level is lower than normal  A CPAP machine may be used to keep your airway open while you sleep  · Surgery  can be done to implant a pacemaker in your chest to regulate your heart rhythm  Other types of surgery can open blocked heart vessels, replace a damaged heart valve, or remove scar tissue  Manage or prevent HF:   · Do not smoke  Nicotine and other chemicals in cigarettes and cigars can cause lung damage and make HF difficult to manage  Ask your healthcare provider for information if you currently smoke and need help to quit  E-cigarettes or smokeless tobacco still contain nicotine  Talk to your healthcare provider before you use these products  · Do not drink alcohol or take illegal drugs  Alcohol and drugs can worsen your symptoms quickly  · Weigh yourself every morning  Use the same scale, in the same spot  Do this after you use the bathroom, but before you eat or drink anything  Wear the same type of clothing  Do not wear shoes   Record your weight each day so you will notice any sudden weight gain  Swelling and weight gain are signs of fluid retention  If you are overweight, ask how to lose weight safely  · Check your blood pressure and heart rate every day  Ask for more information about how to measure your blood pressure and heart rate correctly  Ask what these numbers should be for you  · Manage any chronic health conditions you have  These include high blood pressure, diabetes, obesity, high cholesterol, metabolic syndrome, and COPD  You will have fewer symptoms if you manage these health conditions  Follow your healthcare provider's recommendations and follow up with him or her regularly  · Eat heart-healthy foods and limit sodium (salt)  An easy way to do this is to eat more fresh fruits and vegetables and fewer canned and processed foods  Replace butter and margarine with heart-healthy oils such as olive oil and canola oil  Other heart-healthy foods include walnuts, whole-grain breads, low-fat dairy products, beans, and lean meats  Fatty fish such as salmon and tuna are also heart healthy  Ask how much salt you can eat each day  Do not use salt substitutes  · Drink liquids as directed  You may need to limit the amount of liquids you drink if you retain fluid  Ask how much liquid to drink each day and which liquids are best for you  · Stay active  If you are not active, your symptoms are likely to worsen quickly  Walking, bicycling, and other types of physical activity help maintain your strength and improve your mood  Physical activity also helps you manage your weight  Work with your healthcare provider to create an exercise plan that is right for you  · Get vaccines as directed  Get a flu shot every year  You may also need the pneumonia vaccine  The flu and pneumonia can be severe for a person who has HF  Vaccines protect you from these infections  Join a support group:  Living with HF can be difficult   It may be helpful to talk with others who have HF  You may learn how to better manage your condition or get emotional support  For more information:   · [address]  [place] , [place]   Phone: [phone]  Web Address: [email or web address]  org   Follow up with your healthcare provider or cardiologist within 2 weeks or as directed: You may need to return for other tests  You may need home health care  A healthcare provider will monitor your vital signs, weight, and make sure your medicines are working  Write down your questions so you remember to ask them during your visits  © 2017 [employer]  Information is for End User's use only and may not be sold, redistributed or otherwise used for commercial purposes  All illustrations and images included in CareNotes® are the copyrighted property of [employer] Inc  or [employer]  The above information is an  only  It is not intended as medical advice for individual conditions or treatments  Talk to your doctor, nurse or pharmacist before following any medical regimen to see if it is safe and effective for you

## 2018-05-15 NOTE — PROGRESS NOTES
Assessment/Plan:    Patient presents for hospital follow-up  Patient was admitted for acute on chronic CHF exacerbation  Full details of hospitalization noted below  Patient was started on diuresis for a total of 7 lb weight loss  Since she has been home she has been monitoring her weight without any significant increase  She has continued with her sodium restricted diet  Patient does not currently wear compression stockings  Did recommend that she wear them during the day  A new prescription was given for patient  Also recommend elevating lower extremities  She has not followed up with cardiology since her discharge  She is planning on calling to schedule  Patient is followed by [facility]  On Saturday patient developed a productive cough and LGT  Denies any shortness of breath or wheezing  Given patient's recent hospitalization and comorbidities will start a Z-Markell for concern for HCAP  No new signs of fluid overload  And no changes in her weight since discharge  Patient with significant lower extremity edema however she reports is her baseline at this time  She is to continue on her diuretics  Patient with a past medical history of multiple myeloma  She is to continue her routine follow-up with Hematology [facility] records reviewed     Diagnoses and all orders for this visit:    Bronchitis  -     azithromycin (ZITHROMAX) 250 mg tablet; Take two tab today and then one tab daily until complete  -     promethazine-codeine (PHENERGAN WITH CODEINE) 6 25-10 mg/5 mL syrup; Take 5 mL by mouth daily at bedtime as needed for cough    Acute on chronic combined systolic and diastolic congestive heart failure (HCC)  -     Compression Stocking    Other orders  -     carvedilol (COREG) 6 25 mg tablet; Take 1 tablet by mouth Twice daily  -     Discontinue: famotidine (PEPCID) 20 mg tablet; Take by mouth  -     furosemide (LASIX) 40 mg tablet;  Take 40 mg by mouth 2 (two) times a day    -     potassium chloride (KLOR-CON M20) 20 mEq tablet; Take 1 tablet by mouth every other day    -     lisinopril (ZESTRIL) 5 mg tablet; Take 5 mg by mouth daily    -     LORazepam (ATIVAN) 0 5 mg tablet; Take 1 tablet by mouth every 6 (six) hours as needed  -     ondansetron (ZOFRAN-ODT) 8 mg disintegrating tablet; Take 8 mg by mouth as needed for nausea    -     Pomalidomide 4 MG CAPS; Take 4 mg by mouth daily    -     prochlorperazine (COMPAZINE) 10 mg tablet; Take 10 mg by mouth as needed for nausea    -     senna-docusate sodium (SENOKOT-S) 8 6-50 mg per tablet; Take 1 tablet by mouth as needed for constipation    -     simvastatin (ZOCOR) 40 mg tablet; Take 40 mg by mouth daily at bedtime    -     Cholecalciferol (VITAMIN D3) 52434 units CAPS; Take by mouth  -     Discontinue: bortezomib (VELCADE) 3 5 MG; Inject as directed  -     daratumumab (DARZALEX) IVPB; Infuse 880 mg into a venous catheter every 30 (thirty) days        Subjective:      Patient ID: Светлана Roach is a 77 y o  female  Patient presents for hospital follow-up  She was admitted to [facility] on 5/3-5/8  Patient's daughter called 911 because she said the patient passed out  The patient does not recall passing out  States she may have "dozed off"  Patient presented with complaints of shortness of breath  She was diagnosed with an acute on chronic CHF exacerbation with [place] 81481 upon admission  She was started on IV Lasix and monitor for daily weights  A 2D echo confirmed ejection fraction 25%  This was a decrease  From her previous echo in 2017 showing an EF of 55%  The echo additionally showed a severely reduced left ventricular systolic function, severely dilated left atrium, high right atrial pressures with dilated inferior vena cava  Mild mitral and tricuspid regurgitation and aortic sclerosis  With diuresis patient's overall weight appeared to be down 7 lb from admission  Overall 4 7 L   Patient started on a congestive heart failure sodium restricted diet  She was followed by Cardiology  In was recommended to restart her Coreg  Additional studies completed during hospitalization included a CT to rule out PE  This showed significant cardiomegaly with interstitial edema and a small left pleural effusion  CT head as the patient initially presented as a syncopal episode showing no acute intracranial abnormality  [place] which was essentially normal study  It was felt the patient remained stable and was discharged to home with home care  Patient resides at home with her  and her daughter  Patient has been doing well since her discharge  She is weighing herself on a daily basis  She has noted no significant weight gain  She continues with lower extremity edema which she reports as baseline  She does have compression stockings but she does not wear them as they are difficult to put on  Of note her last compression stockings were ordered approximately five years ago  She is sticking with a low-sodium cardiac diet  She is more aware of head in sources of sodium such as processed foods  Patient does not have a scheduled follow-up with Cardiology  She was previously seen by [facility] Cardiology  She will call to schedule a follow-up appointment  Patient reports that on Saturday she developed a cough slightly productive thick mucus and a low-grade temperature of a 100 0°  Patient denies any shortness of breath or wheezing  Denies any weight gain  URI    This is a new problem  Episode onset: saturday  The maximum temperature recorded prior to her arrival was 100 4 - 100 9 F  Associated symptoms include coughing, diarrhea (related to multiple myeloma treatment) and rhinorrhea (clear)  Pertinent negatives include no abdominal pain, chest pain, ear pain, headaches, nausea, sinus pain, sore throat, vomiting or wheezing       The following portions of the patient's history were reviewed and updated as appropriate: allergies, current medications, past family history, past medical history, past social history, past surgical history and problem list     Review of Systems   Constitutional: Positive for fatigue  Negative for chills and fever  HENT: Positive for rhinorrhea (clear)  Negative for ear pain, sinus pain and sore throat  Respiratory: Positive for cough  Negative for chest tightness, shortness of breath and wheezing  Cardiovascular: Positive for leg swelling  Negative for chest pain and palpitations  Gastrointestinal: Positive for diarrhea (related to multiple myeloma treatment)  Negative for abdominal pain, nausea and vomiting  Neurological: Negative for dizziness, syncope, light-headedness, numbness and headaches  Psychiatric/Behavioral: Positive for sleep disturbance  Negative for dysphoric mood  The patient is not nervous/anxious            Past Medical History:   Diagnosis Date    Abdominal mass, right upper quadrant     resolved: 11/10/2016    Abnormal electrocardiogram     last assessed-11/12/2013    Actinic keratoses     last assessed-11/22/2016    Acute gastric ulcer with hemorrhage     last assessed-10/6/2015    Acute venous embolism and thrombosis of deep vessels of distal lower extremity (HCC)     Basal cell carcinoma     Breast cancer (HCC)     Cellulitis, face     last assessed-8/9/2017    Clostridium difficile colitis     resolved-9/13/2017    Congestive heart failure (CHF) ([address] )     last assessed-1/29/2015    Dyspnea     Edema     last assessed-5/18/2015    Fracture of two ribs, closed     last assessed-2/25/2014    Hematemesis/vomiting blood     last assessed-10/6/2015    Impaired glucose tolerance     last assessed-11/12/2013    Lymphedema     acquired chronic lymphedema-last assessed-11/12/2013    Malignant neoplasm of skin     last assessed-11/12/2013    Mammogram abnormal     Mental status change     Obstructive sleep apnea     last assessed-11/12/2013    Osteoarthritis of hip     last assessed-11/12/2013    Osteoarthritis of knee     last assessed-11/12/2013    Pulmonary embolism (HCC)     Pulmonary embolism and infarction, iatrogenic ([facility] )     last assessed-4/14/2014    S/P IVC filter     last assessed-10/6/2015    Tachycardia     last assessed-11/12/2013    Type 2 diabetes mellitus ([facility] )     last assessed-6/2/2014    Vitamin D deficiency     last assessed-9/23/2014         Current Outpatient Prescriptions:     apixaban (ELIQUIS) 5 mg, Take 1 tablet (5 mg total) by mouth 2 (two) times a day Address additional refills at office visiti, Disp: 60 tablet, Rfl: 1    carvedilol (COREG) 6 25 mg tablet, Take 1 tablet by mouth Twice daily, Disp: , Rfl:     Cholecalciferol (VITAMIN D3) 28321 units CAPS, Take by mouth, Disp: , Rfl:     furosemide (LASIX) 40 mg tablet, Take 40 mg by mouth 2 (two) times a day  , Disp: , Rfl:     lisinopril (ZESTRIL) 5 mg tablet, Take 5 mg by mouth daily  , Disp: , Rfl:     ondansetron (ZOFRAN-ODT) 8 mg disintegrating tablet, Take 8 mg by mouth as needed for nausea  , Disp: , Rfl:     Pomalidomide 4 MG CAPS, Take 4 mg by mouth daily  , Disp: , Rfl:     potassium chloride (KLOR-CON M20) 20 mEq tablet, Take 1 tablet by mouth every other day  , Disp: , Rfl:     prochlorperazine (COMPAZINE) 10 mg tablet, Take 10 mg by mouth as needed for nausea  , Disp: , Rfl:     senna-docusate sodium (SENOKOT-S) 8 6-50 mg per tablet, Take 1 tablet by mouth as needed for constipation  , Disp: , Rfl:     simvastatin (ZOCOR) 40 mg tablet, Take 40 mg by mouth daily at bedtime  , Disp: , Rfl:     bortezomib (VELCADE) 3 5 MG, Inject as directed, Disp: , Rfl:     famotidine (PEPCID) 20 mg tablet, Take by mouth, Disp: , Rfl:     LORazepam (ATIVAN) 0 5 mg tablet, Take 1 tablet by mouth every 6 (six) hours as needed, Disp: , Rfl:     Allergies   Allergen Reactions    Zoledronic Acid      Other reaction(s):  Other (See Comments) ZOMETA  Osteonecrosis of the mandible    Wound Dressing Adhesive     Adhesive  [Medical Tape] Rash    Latex Rash     RASH       Social History   Past Surgical History:   Procedure Laterality Date    BONE MARROW BIOPSY      BREAST SURGERY      breast cancer [id number]    DILATION AND CURETTAGE OF UTERUS      DILATION AND CURETTAGE OF UTERUS      resolved-5/2007    HIP SURGERY      hip replacement-resolved-5/19/2009    KNEE SURGERY Right     resolved-March 2009    LYMPHADENECTOMY      lymph node removal     Family History   Problem Relation Age of Onset    No Known Problems Mother     No Known Problems Father     Hypertension Brother      benign essential HTN    Breast cancer Maternal Aunt        Objective:  /66 (BP Location: Left arm, Patient Position: Sitting, Cuff Size: Standard)   Pulse 63   Temp 100 °F (37 8 °C) (Tympanic)   Ht 4' 8 69" (1 44 m)   Wt 55 5 kg (122 lb 6 4 oz)   SpO2 95%   BMI 26 77 kg/m²      Physical Exam   Constitutional: She is oriented to person, place, and time  She appears well-developed and well-nourished  No distress  HENT:   Head: Normocephalic and atraumatic  Right Ear: Hearing, tympanic membrane, external ear and ear canal normal    Left Ear: Hearing, tympanic membrane, external ear and ear canal normal    Nose: Rhinorrhea (clear) present  No mucosal edema  Right sinus exhibits no maxillary sinus tenderness and no frontal sinus tenderness  Left sinus exhibits no maxillary sinus tenderness and no frontal sinus tenderness  Mouth/Throat: Uvula is midline, oropharynx is clear and moist and mucous membranes are normal    Neck: Neck supple  No JVD present  Cardiovascular: Normal rate and regular rhythm  Murmur (2/6 systolic murmur) heard  B/L nonpitting edema R > L  Pulmonary/Chest: Effort normal and breath sounds normal  No respiratory distress  She has no wheezes  She has no rales     Neurological: She is alert and oriented to person, place, and time  No focal deficits   Skin: Skin is warm and dry  Dry skin to B/L shin  No surrounding erythema or weaping   Psychiatric: She has a normal mood and affect  Her behavior is normal  Judgment and thought content normal    Nursing note and vitals reviewed

## 2018-07-24 ENCOUNTER — OFFICE VISIT (OUTPATIENT)
Dept: INTERNAL MEDICINE CLINIC | Age: 78
End: 2018-07-24
Payer: MEDICARE

## 2018-07-24 VITALS
BODY MASS INDEX: 25.73 KG/M2 | TEMPERATURE: 97.3 F | OXYGEN SATURATION: 94 % | DIASTOLIC BLOOD PRESSURE: 82 MMHG | HEART RATE: 97 BPM | HEIGHT: 58 IN | WEIGHT: 122.6 LBS | SYSTOLIC BLOOD PRESSURE: 154 MMHG

## 2018-07-24 DIAGNOSIS — E78.5 HYPERLIPIDEMIA, UNSPECIFIED HYPERLIPIDEMIA TYPE: ICD-10-CM

## 2018-07-24 DIAGNOSIS — Z12.11 SCREENING FOR COLON CANCER: ICD-10-CM

## 2018-07-24 DIAGNOSIS — Z13.820 ENCOUNTER FOR SCREENING FOR OSTEOPOROSIS: Primary | ICD-10-CM

## 2018-07-24 DIAGNOSIS — M81.0 OSTEOPOROSIS, UNSPECIFIED OSTEOPOROSIS TYPE, UNSPECIFIED PATHOLOGICAL FRACTURE PRESENCE: ICD-10-CM

## 2018-07-24 DIAGNOSIS — C90.00 MULTIPLE MYELOMA NOT HAVING ACHIEVED REMISSION (HCC): ICD-10-CM

## 2018-07-24 DIAGNOSIS — E55.9 VITAMIN D DEFICIENCY: ICD-10-CM

## 2018-07-24 DIAGNOSIS — G45.9 TRANSIENT CEREBRAL ISCHEMIA, UNSPECIFIED TYPE: ICD-10-CM

## 2018-07-24 DIAGNOSIS — Z00.00 ENCOUNTER FOR PREVENTIVE HEALTH EXAMINATION: ICD-10-CM

## 2018-07-24 PROCEDURE — G0439 PPPS, SUBSEQ VISIT: HCPCS | Performed by: NURSE PRACTITIONER

## 2018-07-24 RX ORDER — DEXAMETHASONE 4 MG/1
TABLET ORAL
COMMUNITY
Start: 2018-06-14 | End: 2018-10-05 | Stop reason: SDUPTHER

## 2018-07-24 NOTE — PROGRESS NOTES
Assessment and Plan:  Problem List Items Addressed This Visit     None        Health Maintenance Due   Topic Date Due    Depression Screening PHQ-9  1940    HEMOGLOBIN A1C  1940    DTaP,Tdap,and Td Vaccines (1 - Tdap) 05/05/1960    Fall Risk  05/05/2004    Urinary Incontinence Screening  05/05/2004    GLAUCOMA SCREENING 65 + YR  05/05/2006         HPI:  Claudean Grant is a 77 y o  female here for her Subsequent Wellness Visit      Patient Active Problem List   Diagnosis    Bronchitis    Congestive heart failure (CHF) (HCC)    Acid reflux    Acute on chronic combined systolic and diastolic heart failure (HCC)    Acute on chronic systolic CHF (congestive heart failure) (HCC)    Anemia    Anemia associated with chemotherapy    Anxiety    Benign essential hypertension    Bilateral edema of lower extremity    Cerebrovascular accident (CVA) due to embolism of right middle cerebral artery (HCC)    Chronic anticoagulation    Chronic systolic heart failure, ACC/AHA stage C (HCC)    Dilated congestive cardiomyopathy ([address] )    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[address] )    DVT of lower extremity, bilateral ([address] )    Encounter for current long-term use of anticoagulants    Generalized osteoarthritis    History of breast cancer in female    Hyperlipidemia    Hypokalemia    Multiple myeloma not having achieved remission ([address] )    Obstructive sleep apnea    Osteoporosis    Other primary cardiomyopathies    Pulmonary embolism ([address] )    Syncope    TIA (transient ischemic attack)     Past Medical History:   Diagnosis Date    Abdominal mass, right upper quadrant     resolved: 11/10/2016    Abnormal electrocardiogram     last assessed-11/12/2013    Actinic keratoses     last assessed-11/22/2016    Acute gastric ulcer with hemorrhage     last assessed-10/6/2015    Acute venous embolism and thrombosis of deep vessels of distal lower extremity (HCC)     Basal cell carcinoma     Breast cancer ([place] [address] )     Cellulitis, face     last assessed-8/9/2017    Clostridium difficile colitis     resolved-9/13/2017    Congestive heart failure (CHF) (HCC)     last assessed-1/29/2015    Dyspnea     Edema     last assessed-5/18/2015    Fracture of two ribs, closed     last assessed-2/25/2014    Hematemesis/vomiting blood     last assessed-10/6/2015    Impaired glucose tolerance     last assessed-11/12/2013    Lymphedema     acquired chronic lymphedema-last assessed-11/12/2013    Malignant neoplasm of skin     last assessed-11/12/2013    Mammogram abnormal     Mental status change     Obstructive sleep apnea     last assessed-11/12/2013    Osteoarthritis of hip     last assessed-11/12/2013    Osteoarthritis of knee     last assessed-11/12/2013    Pulmonary embolism (HCC)     Pulmonary embolism and infarction, iatrogenic ([place] [address] )     last assessed-4/14/2014    S/P IVC filter     last assessed-10/6/2015    Tachycardia     last assessed-11/12/2013    Type 2 diabetes mellitus ([place] [address] )     last assessed-6/2/2014    Vitamin D deficiency     last assessed-9/23/2014     Past Surgical History:   Procedure Laterality Date    BONE MARROW BIOPSY      BREAST SURGERY      breast cancer [id number]    DILATION AND CURETTAGE OF UTERUS      DILATION AND CURETTAGE OF UTERUS      resolved-5/2007    HIP SURGERY      hip replacement-resolved-5/19/2009    KNEE SURGERY Right     resolved-March 2009    LYMPHADENECTOMY      lymph node removal     Family History   Problem Relation Age of Onset    No Known Problems Mother     No Known Problems Father     Hypertension Brother         benign essential HTN    Breast cancer Maternal Aunt      History   Smoking Status    Never Smoker   Smokeless Tobacco    Never Used     History   Alcohol Use    Yes     Comment: RARE OCCASION      History   Drug Use No         Current Outpatient Prescriptions   Medication Sig Dispense Refill    apixaban (ELIQUIS) 5 mg Take 1 tablet (5 mg total) by mouth 2 (two) times a day Address additional refills at office visiti 60 tablet 1    carvedilol (COREG) 6 25 mg tablet Take 1 tablet by mouth Twice daily      Cholecalciferol (VITAMIN D3) 21934 units CAPS Take by mouth      daratumumab (DARZALEX) IVPB 880 mg Every 4 weeks       dexamethasone (DECADRON) 4 mg tablet Take 5 tablets (20 mg) weekly      furosemide (LASIX) 40 mg tablet Take 40 mg by mouth 2 (two) times a day        lisinopril (ZESTRIL) 5 mg tablet Take 5 mg by mouth daily        LORazepam (ATIVAN) 0 5 mg tablet Take 1 tablet by mouth every 6 (six) hours as needed      ondansetron (ZOFRAN-ODT) 8 mg disintegrating tablet Take 8 mg by mouth as needed for nausea        Pomalidomide 4 MG CAPS Take 4 mg by mouth daily        potassium chloride (KLOR-CON M20) 20 mEq tablet Take 1 tablet by mouth every other day        prochlorperazine (COMPAZINE) 10 mg tablet Take 10 mg by mouth as needed for nausea        senna-docusate sodium (SENOKOT-S) 8 6-50 mg per tablet Take 1 tablet by mouth as needed for constipation        simvastatin (ZOCOR) 40 mg tablet Take 40 mg by mouth daily at bedtime         No current facility-administered medications for this visit  Allergies   Allergen Reactions    Zoledronic Acid      Other reaction(s):  Other (See Comments) ZOMETA  Osteonecrosis of the mandible    Wound Dressing Adhesive     Adhesive  [Medical Tape] Rash    Latex Rash     RASH     Immunization History   Administered Date(s) Administered    Influenza 10/17/2006, 11/28/2007, 09/23/2009, 10/25/2010    Influenza Split High Dose Preservative Free IM 09/23/2014, 10/06/2015, 11/10/2016, 12/01/2017    Influenza TIV (IM) 10/11/2011, 10/09/2012, 12/16/2013    Pneumococcal Conjugate 13-Valent 01/16/2018    Pneumococcal Polysaccharide PPV23 09/18/2006       Patient Care Team:  Tiffany Nichole MD as PCP - General  Hermelinda Bass, DO    Medicare Screening Tests and Risk Assessments:  AWV Clinical     ISAR:       Once in a Lifetime Medicare Screening:       Medicare Screening Tests and Risk Assessment:   AAA Risk Assessment    Osteoporosis Risk Assessment    HIV Risk Assessment        Drug and Alcohol Use:   Tobacco use    Tobacco use duration    Tobacco Cessation Readiness    Alcohol use    Alcohol Treatment Readiness   Illicit Drug Use        Diet & Exercise:   Diet   How many servings a day of the following:   Exercise        Cognitive Impairment Screening:   Cognitive Impairment Screening        Functional Ability/Level of Safety:   Hearing    Hearing Impairment Assessment    Current Activities    Help needed with the folllowing:    ADL    Fall Risk   Injury History       Home Safety:   Home Safety Risk Factors       Advanced Directives:   Advanced Directives    Patient's End of Life Decisions        Urinary Incontinence:       Glaucoma:            Provider Screening    No data filed        Physical Exam   Constitutional: She is oriented to person, place, and time  She appears well-developed and well-nourished  HENT:   Head: Normocephalic and atraumatic  Right Ear: External ear normal    Left Ear: External ear normal    Nose: Nose normal    Mouth/Throat: Oropharynx is clear and moist  No oropharyngeal exudate  Cerumen impaction of bilateral ears  Eyes: Conjunctivae and EOM are normal  Pupils are equal, round, and reactive to light  No scleral icterus  Neck: Normal range of motion  Neck supple  No JVD present  No tracheal deviation present  No thyromegaly present  Cardiovascular: Normal rate  An irregular rhythm present  Murmur heard  Pulmonary/Chest: Effort normal and breath sounds normal  She has no wheezes  Abdominal: Soft  Bowel sounds are normal  She exhibits no distension  There is no tenderness  Musculoskeletal: Normal range of motion  She exhibits no edema or tenderness  Lymphadenopathy:     She has no cervical adenopathy     Neurological: She is alert and oriented to person, place, and time  No cranial nerve deficit  Skin: Skin is warm and dry  Capillary refill takes less than 2 seconds  No rash noted  No erythema  No pallor  Psychiatric: She has a normal mood and affect  Her behavior is normal  Judgment and thought content normal        Assessment:      1  Encounter for preventative health examination  Plan:    1  Encounter for screening for osteoporosis  I did give patient an order for DEXA scan  She had been on Zometa for tx for her multiple myeloma but this medication was stopped due to "jaw infection"  Patient will have DEXA scan performed at [facility] as she has her mammograms performed there every year  2   Encounter for screening for colon cancer  Order was given for GI consult for colonoscopy but patient would like to further discuss this issue with Dr Wei Avila on her next office visit in September  3   Encounter for screening for fall risk  Screening performed today  Patient has not had any falls in the last 1 year  4   Encounter for screening for depression  Patient scored 7 on depression screening today  Patient is not on antidepressant at this time  She has follow up with Dr Wei Avila in September and this will be further discussed at that time  5  Vitamin D deficiency - patient has not had vitamin D level checked in some time  I did reorder vitamin D to be drawn prior to seeing Dr Wei Avila in September  6   Hyperlipidemia - on simvastatin  Patient has not had lipid profile drawn in some time  Will have drawn prior to visit in December  Return for Medicare Wellness examination in one year

## 2018-09-28 ENCOUNTER — OFFICE VISIT (OUTPATIENT)
Dept: INTERNAL MEDICINE CLINIC | Age: 78
End: 2018-09-28
Payer: MEDICARE

## 2018-09-28 VITALS
HEART RATE: 81 BPM | BODY MASS INDEX: 26.71 KG/M2 | TEMPERATURE: 98.5 F | SYSTOLIC BLOOD PRESSURE: 132 MMHG | WEIGHT: 127.8 LBS | DIASTOLIC BLOOD PRESSURE: 60 MMHG | OXYGEN SATURATION: 99 %

## 2018-09-28 DIAGNOSIS — Z86.19 HISTORY OF CLOSTRIDIUM DIFFICILE COLITIS: ICD-10-CM

## 2018-09-28 DIAGNOSIS — R60.0 BILATERAL EDEMA OF LOWER EXTREMITY: Primary | ICD-10-CM

## 2018-09-28 DIAGNOSIS — L03.116 CELLULITIS OF LEFT LOWER EXTREMITY: ICD-10-CM

## 2018-09-28 PROCEDURE — 99213 OFFICE O/P EST LOW 20 MIN: CPT | Performed by: NURSE PRACTITIONER

## 2018-09-28 RX ORDER — CEPHALEXIN 500 MG/1
500 CAPSULE ORAL EVERY 8 HOURS SCHEDULED
Qty: 21 CAPSULE | Refills: 0 | Status: SHIPPED | OUTPATIENT
Start: 2018-09-28 | End: 2018-10-05

## 2018-09-28 RX ORDER — METRONIDAZOLE 500 MG/1
500 TABLET ORAL EVERY 8 HOURS SCHEDULED
Qty: 30 TABLET | Refills: 0 | Status: SHIPPED | OUTPATIENT
Start: 2018-09-28 | End: 2018-10-08

## 2018-09-28 NOTE — PATIENT INSTRUCTIONS
Avoid skiping lasix  Elevate leg in evening    Watch your sodium intake  Would to stop wearing your stockings as they are too tight around your calves  Will start antibiotics  Take yogurt or probiotic to protect your stomach  Follow-up in 1 week for re-evaluation

## 2018-09-28 NOTE — PROGRESS NOTES
Assessment/Plan:    Cellulitis:  Will start keflex x 7 days with an overlap of flagyl x 10 days due to pt hx of c diff as recommended by Dr Tania Albert  Also recommend activa yogurt to protect her gut  Additionally d/w pt the importance of not skipping her lasix, as she reports she has intermittently  D/w pt the importance of her elevating her LE in evening, low sodium diet  She refuses compression stockings as she thinks they are too difficult to put on  She does wear nylon stockings - however the opening is too tight for patient's calves  Pt to return for re-eval in 1 week  Diagnoses and all orders for this visit:    Bilateral edema of lower extremity    Cellulitis of left lower extremity  -     cephalexin (KEFLEX) 500 mg capsule; Take 1 capsule (500 mg total) by mouth every 8 (eight) hours for 7 days  -     metroNIDAZOLE (FLAGYL) 500 mg tablet; Take 1 tablet (500 mg total) by mouth every 8 (eight) hours for 10 days    History of Clostridium difficile colitis  -     metroNIDAZOLE (FLAGYL) 500 mg tablet; Take 1 tablet (500 mg total) by mouth every 8 (eight) hours for 10 days          Subjective:      Patient ID: Devan Cohen is a 77 y o  female  HPI    Pt reports that she had her port placed for chemo and the staff told her to follow-up with her PCP as she may have cellulitis to her LLE  She reports that she has hx of LE edema secondary to CHF and her chemo  She currently takes lasix, however she has missed a few doses as she does not like taking when she has to be out for appointments due to excessive urination  She denies fever, chills, CP, palpitations, SOB, calf pain  Overall pt feels well with no concerns  Pt has a hx of multiple myeloma      The following portions of the patient's history were reviewed and updated as appropriate: allergies, current medications, past family history, past medical history, past social history, past surgical history and problem list     Review of Systems Constitutional: Negative for chills, fatigue and fever  Respiratory: Negative for cough, shortness of breath and wheezing  Cardiovascular: Positive for leg swelling  Negative for chest pain and palpitations  Gastrointestinal: Negative for abdominal pain, constipation, diarrhea, nausea and vomiting  Musculoskeletal: Negative for arthralgias  Skin: Positive for rash and wound  Neurological: Negative for dizziness, light-headedness, numbness and headaches  Psychiatric/Behavioral: Negative for dysphoric mood  The patient is not nervous/anxious            Past Medical History:   Diagnosis Date    Abdominal mass, right upper quadrant     resolved: 11/10/2016    Abnormal electrocardiogram     last assessed-11/12/2013    Actinic keratoses     last assessed-11/22/2016    Acute gastric ulcer with hemorrhage     last assessed-10/6/2015    Acute venous embolism and thrombosis of deep vessels of distal lower extremity (HCC)     Basal cell carcinoma     Breast cancer (HCC)     Cellulitis, face     last assessed-8/9/2017    Clostridium difficile colitis     resolved-9/13/2017    Congestive heart failure (CHF) (HCC)     last assessed-1/29/2015    Dyspnea     Edema     last assessed-5/18/2015    Fracture of two ribs, closed     last assessed-2/25/2014    Hematemesis/vomiting blood     last assessed-10/6/2015    Impaired glucose tolerance     last assessed-11/12/2013    Lymphedema     acquired chronic lymphedema-last assessed-11/12/2013    Malignant neoplasm of skin     last assessed-11/12/2013    Mammogram abnormal     Mental status change     Obstructive sleep apnea     last assessed-11/12/2013    Osteoarthritis of hip     last assessed-11/12/2013    Osteoarthritis of knee     last assessed-11/12/2013    Pulmonary embolism (HCC)     Pulmonary embolism and infarction, iatrogenic ([facility] [address] )     last assessed-4/14/2014    S/P IVC filter     last assessed-10/6/2015    Tachycardia     last assessed-11/12/2013    Type 2 diabetes mellitus ([facility] [address] )     last assessed-6/2/2014    Vitamin D deficiency     last assessed-9/23/2014         Current Outpatient Prescriptions:     apixaban (ELIQUIS) 5 mg, Take 1 tablet (5 mg total) by mouth 2 (two) times a day Address additional refills at office visiti, Disp: 60 tablet, Rfl: 1    carvedilol (COREG) 6 25 mg tablet, Take 1 tablet by mouth Twice daily, Disp: , Rfl:     Cholecalciferol (VITAMIN D3) 22216 units CAPS, Take by mouth, Disp: , Rfl:     dexamethasone (DECADRON) 4 mg tablet, Take 5 tablets (20 mg) weekly, Disp: , Rfl:     furosemide (LASIX) 40 mg tablet, Take 40 mg by mouth 2 (two) times a day  , Disp: , Rfl:     lisinopril (ZESTRIL) 5 mg tablet, Take 5 mg by mouth daily  , Disp: , Rfl:     LORazepam (ATIVAN) 0 5 mg tablet, Take 1 tablet by mouth every 6 (six) hours as needed, Disp: , Rfl:     ondansetron (ZOFRAN-ODT) 8 mg disintegrating tablet, Take 8 mg by mouth as needed for nausea  , Disp: , Rfl:     Pomalidomide 4 MG CAPS, Take 4 mg by mouth daily  , Disp: , Rfl:     potassium chloride (KLOR-CON M20) 20 mEq tablet, Take 1 tablet by mouth every other day  , Disp: , Rfl:     prochlorperazine (COMPAZINE) 10 mg tablet, Take 10 mg by mouth as needed for nausea  , Disp: , Rfl:     senna-docusate sodium (SENOKOT-S) 8 6-50 mg per tablet, Take 1 tablet by mouth as needed for constipation  , Disp: , Rfl:     simvastatin (ZOCOR) 40 mg tablet, Take 40 mg by mouth daily at bedtime  , Disp: , Rfl:     daratumumab (DARZALEX) IVPB, 880 mg Every 4 weeks , Disp: , Rfl:     Allergies   Allergen Reactions    Zoledronic Acid      Other reaction(s):  Other (See Comments) ZOMETA  Osteonecrosis of the mandible    Wound Dressing Adhesive     Adhesive  [Medical Tape] Rash    Latex Rash     RASH       Social History   Past Surgical History:   Procedure Laterality Date    BONE MARROW BIOPSY      BREAST SURGERY      breast cancer [id number]    DILATION AND CURETTAGE OF UTERUS      DILATION AND CURETTAGE OF UTERUS      resolved-5/2007    HIP SURGERY      hip replacement-resolved-5/19/2009    KNEE SURGERY Right     resolved-March 2009    LYMPHADENECTOMY      lymph node removal     Family History   Problem Relation Age of Onset    No Known Problems Mother     No Known Problems Father     Hypertension Brother         benign essential HTN    Breast cancer Maternal Aunt        Objective:  /60 (BP Location: Left arm, Patient Position: Sitting, Cuff Size: Standard)   Pulse 81   Temp 98 5 °F (36 9 °C) (Tympanic)   Wt 58 kg (127 lb 12 8 oz)   SpO2 99%   BMI 26 71 kg/m²      Physical Exam   Constitutional: She is oriented to person, place, and time  She appears well-developed and well-nourished  No distress  Cardiovascular: Normal rate, regular rhythm and normal heart sounds  No murmur heard  B/L non pitting LE edema  Pulmonary/Chest: Effort normal and breath sounds normal  No respiratory distress  She has no wheezes  Neurological: She is alert and oriented to person, place, and time  Skin: Skin is warm and dry  No rash noted  See photo for full details  Pt with scab to L tib/fib with surrounding erythema and warmth  No active drainage  + dry skin also noted to B/L calves  Chronic venous color change to RLE  Psychiatric: She has a normal mood and affect  Her behavior is normal  Judgment and thought content normal    Nursing note and vitals reviewed

## 2018-10-05 ENCOUNTER — OFFICE VISIT (OUTPATIENT)
Dept: INTERNAL MEDICINE CLINIC | Age: 78
End: 2018-10-05
Payer: MEDICARE

## 2018-10-05 VITALS
HEIGHT: 57 IN | TEMPERATURE: 97.8 F | OXYGEN SATURATION: 95 % | HEART RATE: 64 BPM | SYSTOLIC BLOOD PRESSURE: 128 MMHG | BODY MASS INDEX: 27.66 KG/M2 | DIASTOLIC BLOOD PRESSURE: 82 MMHG | WEIGHT: 128.2 LBS

## 2018-10-05 DIAGNOSIS — Z23 FLU VACCINE NEED: ICD-10-CM

## 2018-10-05 DIAGNOSIS — L03.116 CELLULITIS OF LEFT LOWER EXTREMITY: Primary | ICD-10-CM

## 2018-10-05 DIAGNOSIS — R60.0 BILATERAL EDEMA OF LOWER EXTREMITY: ICD-10-CM

## 2018-10-05 PROCEDURE — 99213 OFFICE O/P EST LOW 20 MIN: CPT | Performed by: NURSE PRACTITIONER

## 2018-10-05 RX ORDER — DEXAMETHASONE 4 MG/1
20 TABLET ORAL WEEKLY
COMMUNITY
Start: 2018-10-04

## 2018-10-05 NOTE — PROGRESS NOTES
Assessment/Plan:    Cellulitis:  Greatly improved, almost resolved  Patient is to continue her Keflex and Flagyl  Of note there is an overlap course of Flagyl due to her history of C diff  She is tolerating antibiotics without problems  Denies fever, chills, appetite changes  Bilateral lower extremity edema:  Patient to continue with her Lasix, low-sodium diet, elevation of her lower extremities in the evening  She refuses compression stockings in the past     Influenza was held at this time given cellulitis  She has a follow-up appointment 10/15  Patient will continue that follow-up appointment and discuss influenza at that time     Diagnoses and all orders for this visit:    Cellulitis of left lower extremity    Bilateral edema of lower extremity    Flu vaccine need  -     Cancel: influenza vaccine, 2183-6412, high-dose, PF 0 5 mL, for patients 65 yr+ (FLUZONE HIGH-DOSE)    Other orders  -     dexamethasone (DECADRON) 4 mg tablet; Take 20 mg by mouth once a week        Subjective:      Patient ID: Lissett Fleming is a 77 y o  female  HPI    Patient presents for a one week follow-up for cellulitis to left lower extremity  Patient was seen on 09/28  She was started on a seven day course of Keflex as well as an overlap 10 day course of Flagyl due to her history of C diff  Patient reports she has been tolerating medication without difficulties  She denies any nausea, vomiting, diarrhea  However she did recently have her chemo medication increased by her oncologist and has started some nausea relating to that  However prior to was not experiencing nausea  She still has a good appetite and is overall feeling well  Patient denies fever, chills, chest pain, palpitations, calf pain, shortness of breath  Additionally patient has been elevating her lower extremities in the evening continue with a low-sodium diet  She is additionally continue with her Lasix     Of note she has refused compression stockings in the past this may have been too difficult for her to put on  The following portions of the patient's history were reviewed and updated as appropriate: allergies, current medications, past family history, past medical history, past social history, past surgical history and problem list     Review of Systems   Constitutional: Negative for appetite change, chills, fatigue and fever  Respiratory: Negative for shortness of breath  Cardiovascular: Positive for leg swelling  Negative for chest pain and palpitations  Gastrointestinal: Negative for abdominal pain, constipation, diarrhea, nausea and vomiting  Musculoskeletal: Negative for arthralgias  Skin: Negative for rash and wound  Neurological: Negative for dizziness and light-headedness           Past Medical History:   Diagnosis Date    Abdominal mass, right upper quadrant     resolved: 11/10/2016    Abnormal electrocardiogram     last assessed-11/12/2013    Actinic keratoses     last assessed-11/22/2016    Acute gastric ulcer with hemorrhage     last assessed-10/6/2015    Acute venous embolism and thrombosis of deep vessels of distal lower extremity (HCC)     Basal cell carcinoma     Breast cancer (HCC)     Cellulitis, face     last assessed-8/9/2017    Clostridium difficile colitis     resolved-9/13/2017    Congestive heart failure (CHF) (HCC)     last assessed-1/29/2015    Dyspnea     Edema     last assessed-5/18/2015    Fracture of two ribs, closed     last assessed-2/25/2014    Hematemesis/vomiting blood     last assessed-10/6/2015    Impaired glucose tolerance     last assessed-11/12/2013    Lymphedema     acquired chronic lymphedema-last assessed-11/12/2013    Malignant neoplasm of skin     last assessed-11/12/2013    Mammogram abnormal     Mental status change     Multiple myeloma ([facility] [address] )     Obstructive sleep apnea     last assessed-11/12/2013    Osteoarthritis of hip     last assessed-11/12/2013    Osteoarthritis of knee     last assessed-11/12/2013    Pulmonary embolism (HCC)     Pulmonary embolism and infarction, iatrogenic ([facility] )     last assessed-4/14/2014    S/P IVC filter     last assessed-10/6/2015    Tachycardia     last assessed-11/12/2013    Type 2 diabetes mellitus ([facility] )     last assessed-6/2/2014    Vitamin D deficiency     last assessed-9/23/2014         Current Outpatient Prescriptions:     apixaban (ELIQUIS) 5 mg, Take 1 tablet (5 mg total) by mouth 2 (two) times a day Address additional refills at office visiti, Disp: 60 tablet, Rfl: 1    carvedilol (COREG) 6 25 mg tablet, Take 1 tablet by mouth Twice daily, Disp: , Rfl:     cephalexin (KEFLEX) 500 mg capsule, Take 1 capsule (500 mg total) by mouth every 8 (eight) hours for 7 days, Disp: 21 capsule, Rfl: 0    Cholecalciferol (VITAMIN D3) 01476 units CAPS, Take by mouth, Disp: , Rfl:     daratumumab (DARZALEX) IVPB, 880 mg Every 4 weeks , Disp: , Rfl:     dexamethasone (DECADRON) 4 mg tablet, Take 20 mg by mouth once a week, Disp: , Rfl:     furosemide (LASIX) 40 mg tablet, Take 40 mg by mouth 2 (two) times a day  , Disp: , Rfl:     lisinopril (ZESTRIL) 5 mg tablet, Take 5 mg by mouth daily  , Disp: , Rfl:     LORazepam (ATIVAN) 0 5 mg tablet, Take 1 tablet by mouth every 6 (six) hours as needed, Disp: , Rfl:     metroNIDAZOLE (FLAGYL) 500 mg tablet, Take 1 tablet (500 mg total) by mouth every 8 (eight) hours for 10 days, Disp: 30 tablet, Rfl: 0    ondansetron (ZOFRAN-ODT) 8 mg disintegrating tablet, Take 8 mg by mouth as needed for nausea  , Disp: , Rfl:     Pomalidomide 4 MG CAPS, Take 4 mg by mouth daily  , Disp: , Rfl:     potassium chloride (KLOR-CON M20) 20 mEq tablet, Take 1 tablet by mouth every other day  , Disp: , Rfl:     prochlorperazine (COMPAZINE) 10 mg tablet, Take 10 mg by mouth as needed for nausea  , Disp: , Rfl:     senna-docusate sodium (SENOKOT-S) 8 6-50 mg per tablet, Take 1 tablet by mouth as needed for constipation  , Disp: , Rfl:     simvastatin (ZOCOR) 40 mg tablet, Take 40 mg by mouth daily at bedtime  , Disp: , Rfl:     Allergies   Allergen Reactions    Zoledronic Acid      Other reaction(s): Other (See Comments) ZOMETA  Osteonecrosis of the mandible    Wound Dressing Adhesive     Adhesive  [Medical Tape] Rash    Latex Rash     RASH       Social History   Past Surgical History:   Procedure Laterality Date    BONE MARROW BIOPSY      BREAST SURGERY      breast cancer [id number]    DILATION AND CURETTAGE OF UTERUS      DILATION AND CURETTAGE OF UTERUS      resolved-5/2007    HIP SURGERY      hip replacement-resolved-5/19/2009    KNEE SURGERY Right     resolved-March 2009    LYMPHADENECTOMY      lymph node removal     Family History   Problem Relation Age of Onset    No Known Problems Mother     No Known Problems Father     Hypertension Brother         benign essential HTN    Breast cancer Maternal Aunt        Objective:  /82 (BP Location: Left arm, Patient Position: Sitting, Cuff Size: Standard)   Pulse 64   Temp 97 8 °F (36 6 °C) (Tympanic)   Ht 4' 9 44" (1 459 m)   Wt 58 2 kg (128 lb 3 2 oz)   SpO2 95%   BMI 27 32 kg/m²      Physical Exam   Constitutional: She is oriented to person, place, and time  She appears well-developed and well-nourished  No distress  Cardiovascular: Normal rate, regular rhythm and normal heart sounds  No murmur heard  B/L non pitting LE edema  Pulmonary/Chest: Effort normal and breath sounds normal  No respiratory distress  She has no wheezes  Neurological: She is alert and oriented to person, place, and time  Skin: Skin is warm and dry  No rash noted  See photo for full details  Left lower extremity cellulitis almost resolved  Minimal erythema  No warmth  No active drainage  Dryness noted to bilateral shins    Patient with chronic venous color changes to bilateral lower extremity                Psychiatric: She has a normal mood and affect  Her behavior is normal  Judgment and thought content normal    Nursing note and vitals reviewed

## 2018-10-15 ENCOUNTER — APPOINTMENT (OUTPATIENT)
Dept: LAB | Age: 78
End: 2018-10-15
Payer: MEDICARE

## 2018-10-15 ENCOUNTER — OFFICE VISIT (OUTPATIENT)
Dept: INTERNAL MEDICINE CLINIC | Age: 78
End: 2018-10-15
Payer: MEDICARE

## 2018-10-15 VITALS
DIASTOLIC BLOOD PRESSURE: 88 MMHG | SYSTOLIC BLOOD PRESSURE: 132 MMHG | BODY MASS INDEX: 27.22 KG/M2 | HEART RATE: 82 BPM | OXYGEN SATURATION: 99 % | TEMPERATURE: 97.2 F | WEIGHT: 126.2 LBS | HEIGHT: 57 IN

## 2018-10-15 DIAGNOSIS — R60.0 BILATERAL EDEMA OF LOWER EXTREMITY: ICD-10-CM

## 2018-10-15 DIAGNOSIS — Z23 NEED FOR INFLUENZA VACCINATION: Primary | ICD-10-CM

## 2018-10-15 DIAGNOSIS — E78.00 PURE HYPERCHOLESTEROLEMIA: ICD-10-CM

## 2018-10-15 DIAGNOSIS — I10 BENIGN ESSENTIAL HYPERTENSION: ICD-10-CM

## 2018-10-15 DIAGNOSIS — F41.9 ANXIETY: ICD-10-CM

## 2018-10-15 DIAGNOSIS — T45.1X5A ANEMIA ASSOCIATED WITH CHEMOTHERAPY: ICD-10-CM

## 2018-10-15 DIAGNOSIS — M81.0 AGE-RELATED OSTEOPOROSIS WITHOUT CURRENT PATHOLOGICAL FRACTURE: ICD-10-CM

## 2018-10-15 DIAGNOSIS — D64.81 ANEMIA ASSOCIATED WITH CHEMOTHERAPY: ICD-10-CM

## 2018-10-15 DIAGNOSIS — I42.0 DILATED CONGESTIVE CARDIOMYOPATHY (HCC): ICD-10-CM

## 2018-10-15 DIAGNOSIS — E78.5 HYPERLIPIDEMIA, UNSPECIFIED HYPERLIPIDEMIA TYPE: ICD-10-CM

## 2018-10-15 DIAGNOSIS — Z79.01 CHRONIC ANTICOAGULATION: ICD-10-CM

## 2018-10-15 DIAGNOSIS — C90.00 MULTIPLE MYELOMA NOT HAVING ACHIEVED REMISSION (HCC): ICD-10-CM

## 2018-10-15 DIAGNOSIS — E55.9 VITAMIN D DEFICIENCY: ICD-10-CM

## 2018-10-15 DIAGNOSIS — M15.9 GENERALIZED OSTEOARTHRITIS: ICD-10-CM

## 2018-10-15 DIAGNOSIS — Z85.3 HISTORY OF BREAST CANCER IN FEMALE: ICD-10-CM

## 2018-10-15 PROBLEM — E87.6 HYPOKALEMIA: Status: RESOLVED | Noted: 2017-09-06 | Resolved: 2018-10-15

## 2018-10-15 PROBLEM — L03.116 CELLULITIS OF LEFT LOWER EXTREMITY: Status: RESOLVED | Noted: 2018-09-28 | Resolved: 2018-10-15

## 2018-10-15 PROBLEM — J40 BRONCHITIS: Status: RESOLVED | Noted: 2018-05-15 | Resolved: 2018-10-15

## 2018-10-15 LAB
25(OH)D3 SERPL-MCNC: 24.4 NG/ML (ref 30–100)
CHOLEST SERPL-MCNC: 183 MG/DL (ref 50–200)
HDLC SERPL-MCNC: 79 MG/DL (ref 40–60)
LDLC SERPL CALC-MCNC: 86 MG/DL (ref 0–100)
TRIGL SERPL-MCNC: 88 MG/DL

## 2018-10-15 PROCEDURE — 36415 COLL VENOUS BLD VENIPUNCTURE: CPT

## 2018-10-15 PROCEDURE — 99214 OFFICE O/P EST MOD 30 MIN: CPT | Performed by: FAMILY MEDICINE

## 2018-10-15 PROCEDURE — 90662 IIV NO PRSV INCREASED AG IM: CPT

## 2018-10-15 PROCEDURE — 80061 LIPID PANEL: CPT

## 2018-10-15 PROCEDURE — G0008 ADMIN INFLUENZA VIRUS VAC: HCPCS

## 2018-10-15 PROCEDURE — 82306 VITAMIN D 25 HYDROXY: CPT

## 2018-10-15 RX ORDER — ALLOPURINOL 300 MG/1
1 TABLET ORAL WEEKLY
COMMUNITY
Start: 2018-08-27

## 2018-10-15 NOTE — PROGRESS NOTES
Assessment/Plan:    No problem-specific Assessment & Plan notes found for this encounter  Problem List Items Addressed This Visit        Cardiovascular and Mediastinum    Benign essential hypertension    Dilated congestive cardiomyopathy (HCC)       Musculoskeletal and Integument    Generalized osteoarthritis       Other    Anemia associated with chemotherapy    Anxiety    Bilateral edema of lower extremity    Chronic anticoagulation    History of breast cancer in female    Hyperlipidemia    Relevant Orders    Lipid panel    Comprehensive metabolic panel    Multiple myeloma not having achieved remission [facility])      Other Visit Diagnoses     Need for influenza vaccination    -  Primary    Relevant Orders    influenza vaccine, 6900-3031, high-dose, PF 0 5 mL, for patients 65 yr+ (FLUZONE HIGH-DOSE)             last echo aug 2017, EF 55%, dilated L atrium and pulmonary htn- moderate  Discussion Summary:    Cont with eliquis ,Does not want to follow up Cardiology anymore  she will then call us  discussed compliance with lasix, you need to take it everyday !!! advised increased walking for cardiovascular health  she has not seen cardio in 3 years  schedule f/up with dr Juan Antonio Montano and echo  Arslan Hahn DEXA needs to be scheduled after the chemo and when it is ok with your cancer dr  weigh yourself daily and call if any issues  labs from dr Medhat Montes  reviewed  return in 6 months with cholesterol labs  Subjective:      Patient ID: Harvey Lujan is a 77 y o  female  HPI  Hyperlipidemia (Follow-Up): The patient states her hyperlipidemia has been under good control since the last visit  Comorbid Illnesses: hypertension  Interval Events: hospitalization for multiple myeloma/pathologic L tibial fracture  Symptoms:    Hypertension (Follow-Up): The patient states she has been stable with her blood pressure control since the last visit  Comorbid Illnesses: cardiac failure  Interval Events: none   She has no significant interval events  Symptoms: denies impaired vision,-- denies dyspnea,-- denies chest pain,-- denies intermittent leg claudication-- and-- improved lower extremity edema  Associated symptoms include no headache,-- no focal neurologic deficits-- and-- no memory loss  Home monitoring: The patient is not checking blood pressure at home  Medications: the patient is not adherent with her medication regimen  -- She denies medication side effects  The patient is due for a lipid panel,-- a serum creatinine-- and-- an eye exam      Congestive Heart Failure (Follow-Up): The patient presents for follow-up of systolic heart failure  The patient is NYHA functional Class II  The patient states she has been stable with her heart failure symptoms since the last visit  Interval Events: 6/2/14 did not take lasix for today bc she had a dentist appt in am  she changes her timings a lot for the lasix on her own     Last echocardiogram September 2017  Has habit of skipping her Lasix    Multiple Myeloma: The patient is being seen for a consultation regarding and off zometa x 2 months due to 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, had dental care with anbx and on IV anbx for infectoin  multiple myeloma  Initial presentation was 4 month(s) ago  Presentation included pathological fracture  Current diagnosis was determined by bone marrow biopsy  Past evaluation has included complete blood count  Doing well with infusion treatment for multiple myeloma  Very stable over the past 2 years      Gastrointestinal Bleeding, Upper (Brief): The patient is being seen for follow-up of a hospitalization for upper gastrointestinal bleeding   The patient is currently asymptomatic  (had GIB while in the hospital due to ulcer/ vessel next to it and being on coumadin)  Doing well with Eliquis       The following portions of the patient's history were reviewed and updated as appropriate: allergies, current medications, past family history, past medical history, past social history, past surgical history and problem list     Review of Systems      Constitutional:  Denies fever or chills   Eyes:  Denies change in visual acuity   HENT:  Denies nasal congestion or sore throat   Respiratory:  Denies cough or shortness of breath or wheezing  Cardiovascular:  Denies palpitations or chest pain  GI:  Denies abdominal pain, nausea, or vomiting, occasional constipation  Integument:   Dry skin on legs b/l  Neurologic:  Denies headache or focal weakness        Objective:      /88 (BP Location: Left arm, Patient Position: Sitting, Cuff Size: Adult)   Pulse 82   Temp (!) 97 2 °F (36 2 °C) (Tympanic)   Ht 4' 9 38" (1 458 m)   Wt 57 2 kg (126 lb 3 2 oz)   SpO2 99%   BMI 26 95 kg/m²          Physical Exam      Constitutional:  Well developed, well nourished, no acute distress, non-toxic appearance   Eyes:  PERRL, conjunctiva normal , non icteric sclera  HENT:  Atraumatic, oropharynx moist  MMM Neck-  supple   Respiratory:  CTA b/l, normal breath sounds, no rales, no wheezing   Cardiovascular:  RRR, no murmurs, +1 LE edema b/l- not new  GI:  Soft, nondistended, normal bowel sounds x 4, nontender, no organomegaly, no mass, no rebound, no guarding   Neurologic:  no focal deficits noted   Psychiatric:  Speech and behavior appropriate , AAO x 3  Skin: b/l flaky dry skin LE b/l

## 2018-12-18 ENCOUNTER — TRANSITIONAL CARE MANAGEMENT (OUTPATIENT)
Dept: INTERNAL MEDICINE CLINIC | Facility: CLINIC | Age: 78
End: 2018-12-18

## 2018-12-18 ENCOUNTER — TELEPHONE (OUTPATIENT)
Dept: INTERNAL MEDICINE CLINIC | Facility: CLINIC | Age: 78
End: 2018-12-18

## 2018-12-18 RX ORDER — AMOXICILLIN 250 MG
1 CAPSULE ORAL DAILY PRN
COMMUNITY
Start: 2018-05-08 | End: 2018-12-18 | Stop reason: SDUPTHER

## 2018-12-18 RX ORDER — POTASSIUM CHLORIDE 750 MG/1
10 TABLET, EXTENDED RELEASE ORAL
COMMUNITY
Start: 2018-12-14 | End: 2019-10-15 | Stop reason: SDUPTHER

## 2018-12-18 NOTE — TELEPHONE ENCOUNTER
Could you refer her to wound clinic as well in addition to her appointment with me tomorrow? She will need to see them for this problem

## 2018-12-18 NOTE — TELEPHONE ENCOUNTER
[address] [place] [address] called stating that the patient was discharged from the hospital on 12/14/18 with pitting lower leg edema  She has open wound on the right posterior ankle and left calf that are weeping clear fluids  The tissue is slightly red  She is asking for wound care orders after the patient is seen in the office for her TCM appt on 12/19/18  No pain in the areas  Until orders are received, they are keeping the areas clean and covered  Kenisha can be reached at [phone]  She is asking that orders be faxed to the office at [phone] after the patient is seen

## 2018-12-19 RX ORDER — OXYCODONE HYDROCHLORIDE AND ACETAMINOPHEN 5; 325 MG/1; MG/1
1 TABLET ORAL
COMMUNITY
End: 2018-12-19 | Stop reason: HOSPADM

## 2018-12-19 NOTE — TELEPHONE ENCOUNTER
I tried x2 to contact the patient and x2 to contact the nurse to discuss [address]  I was unable to reach either parties and neither called back  Please discuss at there office visit today

## 2018-12-19 NOTE — TELEPHONE ENCOUNTER
Patient called and med list updated  She is now getting an infusion weekly but she is unclear of the name  She will bring it to her appt today

## 2018-12-19 NOTE — TELEPHONE ENCOUNTER
Patient finally called back this AM   I discussed with her going to the wound care center for treatment or if this is a condition that can be treated by the [address] [place] [address] wound care nurse  If going to wound care is necessary, she would like to go to [facility] wound care as it is closer to her home  She will contact me tomorrow to further discuss if she will proceed with going to wound care after she is assessed today

## 2018-12-20 ENCOUNTER — TELEPHONE (OUTPATIENT)
Dept: INTERNAL MEDICINE CLINIC | Age: 78
End: 2018-12-20

## 2018-12-20 NOTE — TELEPHONE ENCOUNTER
Just wanted to let you know that this patient No Showed for the TCM that was rescheduled from yesterday to today

## 2018-12-31 ENCOUNTER — TRANSITIONAL CARE MANAGEMENT (OUTPATIENT)
Dept: INTERNAL MEDICINE CLINIC | Facility: CLINIC | Age: 78
End: 2018-12-31

## 2018-12-31 ENCOUNTER — TELEPHONE (OUTPATIENT)
Dept: INTERNAL MEDICINE CLINIC | Age: 78
End: 2018-12-31

## 2018-12-31 ENCOUNTER — TELEPHONE (OUTPATIENT)
Dept: OTHER | Facility: OTHER | Age: 78
End: 2018-12-31

## 2018-12-31 LAB
INR PPP: 1.7 (ref 0.86–1.17)
INR PPP: 3.1 (ref 0.86–1.17)

## 2018-12-31 NOTE — TELEPHONE ENCOUNTER
Zullyluh Dalila called again to let us know that she did pull patients INR and is dropping it off at the lab-[facility]--she has not had any Coumadin since Friday------when the results are called into them-they will call the on call doctor for instructions    [phone] number--someone will be on call there for the day if we need to contact them

## 2018-12-31 NOTE — TELEPHONE ENCOUNTER
I spoke with Dr Hernandez Chavez  [facility] may draw INR venipuncture or do fingerstick  He does not have a preference  I then called nurse Freddi Koyanagi at [phone]  Freddi Koyanagi stated that Saw Taylor has not taken any Coumadin since discharge  She was getting the prescription filled today  Austyn Koyanagi drew the specimen and dropped them off at [facility] will be calling the results to the on call doctor

## 2018-12-31 NOTE — TELEPHONE ENCOUNTER
Fredo Beck is calling [facility] is calling regarding this patient  She was discharged from [facility] on 132/28/2018  They have an order for her to do a PT/INR on this patient  They can do the fingerstick one for us instead of drawing it since we will not get the results of that till Wednesday

## 2019-01-01 ENCOUNTER — TELEPHONE (OUTPATIENT)
Dept: INTERNAL MEDICINE CLINIC | Age: 79
End: 2019-01-01

## 2019-01-01 NOTE — TELEPHONE ENCOUNTER
Received INR results after hours yesterday and 1 7  Patient taking 5 mg daily  I advised her to take 10 mg last night and 10 mg today and then resume 5 mg daily  She will repeat her INR on Monday January 7th  She will call the office on January 2nd to schedule an appointment for post hospital follow-up    She did receive a phone call from our office initiating the SHADIA

## 2019-01-02 ENCOUNTER — TELEPHONE (OUTPATIENT)
Dept: INTERNAL MEDICINE CLINIC | Age: 79
End: 2019-01-02

## 2019-01-02 ENCOUNTER — ANTICOAG VISIT (OUTPATIENT)
Dept: INTERNAL MEDICINE CLINIC | Facility: CLINIC | Age: 79
End: 2019-01-02

## 2019-01-02 DIAGNOSIS — I82.492 ACUTE DEEP VEIN THROMBOSIS (DVT) OF OTHER SPECIFIED VEIN OF LEFT LOWER EXTREMITY (HCC): Primary | ICD-10-CM

## 2019-01-02 DIAGNOSIS — I10 BENIGN ESSENTIAL HYPERTENSION: Primary | ICD-10-CM

## 2019-01-02 RX ORDER — LISINOPRIL 5 MG/1
5 TABLET ORAL DAILY
Qty: 90 TABLET | Refills: 1 | Status: SHIPPED | OUTPATIENT
Start: 2019-01-02 | End: 2019-01-03 | Stop reason: SDUPTHER

## 2019-01-02 NOTE — PROGRESS NOTES
She is requesting Percocet  As she is your regular patient, do you want to see her first for this or place the order directly? If not, I can see her in 2 weeks

## 2019-01-02 NOTE — TELEPHONE ENCOUNTER
Pls make sure pt is going to a wound clinic and that she has a f/u here with Dr Sergo Jordan or myself

## 2019-01-02 NOTE — TELEPHONE ENCOUNTER
Pt needs refill on her lisinopril (ZESTRIL) 5 mg tablet She needs it asap because she is out of it  Thanks!

## 2019-01-02 NOTE — TELEPHONE ENCOUNTER
Message left for [facility] to call back  Dr Lea Adams approved her recommendations

## 2019-01-02 NOTE — TELEPHONE ENCOUNTER
Kyra Stallings From [facility] called to let us know about  recommend very light compression using MediGrip  Reduce the nurse  visit to 3 times a week   If more info needed this is here # [phone]

## 2019-01-03 DIAGNOSIS — I82.493 ACUTE DEEP VEIN THROMBOSIS (DVT) OF OTHER SPECIFIED VEIN OF BOTH LOWER EXTREMITIES (HCC): Primary | ICD-10-CM

## 2019-01-03 DIAGNOSIS — I10 BENIGN ESSENTIAL HYPERTENSION: ICD-10-CM

## 2019-01-03 RX ORDER — LISINOPRIL 5 MG/1
5 TABLET ORAL DAILY
Qty: 90 TABLET | Refills: 0 | Status: SHIPPED | OUTPATIENT
Start: 2019-01-03 | End: 2019-08-15

## 2019-01-04 ENCOUNTER — TRANSITIONAL CARE MANAGEMENT (OUTPATIENT)
Dept: INTERNAL MEDICINE CLINIC | Age: 79
End: 2019-01-04

## 2019-01-07 ENCOUNTER — TELEPHONE (OUTPATIENT)
Dept: INTERNAL MEDICINE CLINIC | Age: 79
End: 2019-01-07

## 2019-01-07 NOTE — TELEPHONE ENCOUNTER
Noted   Pt called and rescheduled on 1/9/19 @ 8868 with Dr Vandana Hernandez in [facility]

## 2019-01-07 NOTE — TELEPHONE ENCOUNTER
Good morning,    Just wanted to let you know that this patient did not show up for her TCM appointment that was scheduled today with Virginia Wen

## 2019-01-08 ENCOUNTER — ANTICOAG VISIT (OUTPATIENT)
Dept: INTERNAL MEDICINE CLINIC | Facility: CLINIC | Age: 79
End: 2019-01-08

## 2019-01-08 ENCOUNTER — TELEPHONE (OUTPATIENT)
Dept: INTERNAL MEDICINE CLINIC | Facility: CLINIC | Age: 79
End: 2019-01-08

## 2019-01-08 LAB — INR PPP: 4.7 (ref 0.86–1.17)

## 2019-01-08 NOTE — TELEPHONE ENCOUNTER
----- Message from [address] [facility] [address] sent at 1/8/2019  2:47 PM EST -----  Regarding: FW: Elevated INR  D/w dr Zulay Sinha  Tell pt to hold coumadin for 2 days, then restart at 2 5mg daily and recheck INR in 1 week  May want to also speak with someone she is living with and explain instructions to them as well  Thanks!  Maudine Parkinson       ----- Message -----  From: Chiquita Kumar RN  Sent: 1/8/2019   8:16 AM  To: [facility] Provider  Subject: Elevated INR                                         ----- Message -----  From: Elizabet Decker  Sent: 1/8/2019   8:14 AM  To: [address] [facility]    PT INR RESULT PLEASE RESULT TO APPROPRIATE PROVIDER

## 2019-01-08 NOTE — TELEPHONE ENCOUNTER
Spoke with pt  She confirmed current Coumadin dose 5 mg daily since 1/2/19  She will hold dose 1/8,1/9 then take 2 5 mg daily and repeat INR in 1 wk  Instructions read back correctly    kurt

## 2019-01-09 ENCOUNTER — OFFICE VISIT (OUTPATIENT)
Dept: INTERNAL MEDICINE CLINIC | Age: 79
End: 2019-01-09
Payer: MEDICARE

## 2019-01-09 VITALS
HEART RATE: 69 BPM | DIASTOLIC BLOOD PRESSURE: 62 MMHG | TEMPERATURE: 96.3 F | HEIGHT: 56 IN | BODY MASS INDEX: 26.23 KG/M2 | SYSTOLIC BLOOD PRESSURE: 100 MMHG | WEIGHT: 116.6 LBS | OXYGEN SATURATION: 99 %

## 2019-01-09 DIAGNOSIS — H61.23 BILATERAL IMPACTED CERUMEN: ICD-10-CM

## 2019-01-09 DIAGNOSIS — R79.1 SUPRATHERAPEUTIC INR: ICD-10-CM

## 2019-01-09 DIAGNOSIS — H10.33 ACUTE BACTERIAL CONJUNCTIVITIS OF BOTH EYES: ICD-10-CM

## 2019-01-09 DIAGNOSIS — I82.403 ACUTE DEEP VEIN THROMBOSIS (DVT) OF BOTH LOWER EXTREMITIES, UNSPECIFIED VEIN (HCC): Primary | ICD-10-CM

## 2019-01-09 PROCEDURE — 99495 TRANSJ CARE MGMT MOD F2F 14D: CPT | Performed by: INTERNAL MEDICINE

## 2019-01-09 RX ORDER — CIPROFLOXACIN HYDROCHLORIDE 3.5 MG/ML
1 SOLUTION/ DROPS TOPICAL EVERY 4 HOURS
Qty: 5 ML | Refills: 0 | Status: SHIPPED | OUTPATIENT
Start: 2019-01-09 | End: 2019-08-15

## 2019-01-09 RX ORDER — WARFARIN SODIUM 5 MG/1
5 TABLET ORAL
COMMUNITY
Start: 2018-12-29 | End: 2019-02-22 | Stop reason: SDUPTHER

## 2019-01-09 NOTE — PROGRESS NOTES
Assessment/Plan:    Transitional care management visit  - done 11 days after discharge  -patient is improving    Acute DVT of bilateral extremities-multiple veins  - currently on Coumadin which is supratherapeutic at last check of INR on January 7th, 2019  - patient held the Coumadin for 2 days and will restart tomorrow at half the dose at 2 5 mg daily  - will recheck her INR on January 15th, 2018 and adjust Coumadin as needed based on results    Acute bacterial conjunctivitis of both eyes  - will start patient on ciprofloxacin eyedrops  - she has been counseled to clean her eyelashes with a warm moist cloth  - patient has been counseled to wash hands liberally    Supratherapeutic INR  - last INR was supratherapeutic at 4 7  - Coumadin held for 2 days  - restart Coumadin at 2 5 mg tomorrow  - will recheck INR on January 15th, 2019    Bilateral impacted cerumen  - will prescribe Debrox ear solution  -patient may use it for 5 days and then come to the office for an ear wash         Diagnoses and all orders for this visit:    Acute deep vein thrombosis (DVT) of both lower extremities, unspecified vein (HCC)    Acute bacterial conjunctivitis of both eyes  -     ciprofloxacin (CILOXAN) 0 3 % ophthalmic solution; Administer 1 drop to both eyes every 4 (four) hours    Supratherapeutic INR  -     Protime-INR; Future    Bilateral impacted cerumen  -     carbamide peroxide (DEBROX) 6 5 % otic solution; Administer 5 drops into both ears 2 (two) times a day    Other orders  -     warfarin (COUMADIN) 5 mg tablet; Take 5 mg by mouth          Subjective:      Patient ID: Carolina Guerrero is a 77 y o  female  HPI  Patient presents for a transitional care management visit  She was admitted at [facility]  from December 22nd, 2018 to December 29th, 2018 with deep vein thrombosis of her bilateral lower extremities    She had been on Eliquis prior to the DVT and was switched to Coumadin in the hospital   Her recent INR done on January 7th, 2019 was supratherapeutic at 4 7 and she was told to hold her Coumadin for 2 days(including 1/8/19  and January 9th, 2019 and then restart on January 10th, 2019 at 2 5 mg which is half of her previous dose)  She will recheck her INR level next week  She denies any bleeding from any part of her body  Patient states that she still has fatigue, shortness of breath on exertion and lower extremity edema but states that she has lost about 14 lb since being admitted in hospital(she lost 12 lb while in the hospital and 2 lb since discharge)  She states that she is trying to stick to her fluid and salt restriction but is not exactly sure how much her restriction should be, she thinks it is 32 oz of fluid  She has an appointment see a cardiologist tomorrow  She has occasional nausea and constipation which alternates with diarrhea which she thinks is secondary to her chemotherapy but denies fever, chills, night sweats, chest pain, palpitations, abdominal pain, myalgias arthralgias  Patient also complains of redness of her bilateral eyes, left worse than right with a yellowish discharge that sticks her bilateral eyelashes shut in the morning  She denies eye pain or eye itching  She denies a history of contact  The following portions of the patient's history were reviewed and updated as appropriate: allergies, current medications, past family history, past medical history, past social history, past surgical history and problem list     Review of Systems   Constitutional: Positive for fatigue  Negative for activity change, chills, fever and unexpected weight change  HENT: Negative for ear pain, postnasal drip, rhinorrhea, sinus pressure and sore throat  Eyes: Positive for discharge, redness and itching  Negative for photophobia, pain and visual disturbance  Respiratory: Positive for shortness of breath  Negative for cough, choking, chest tightness and wheezing      Cardiovascular: Negative for chest pain, palpitations and leg swelling  Gastrointestinal: Positive for nausea  Negative for abdominal pain, constipation, diarrhea and vomiting  Genitourinary: Negative for dysuria and hematuria  Musculoskeletal: Negative for arthralgias, back pain, gait problem, joint swelling, myalgias and neck stiffness  Skin: Negative for pallor and rash  Neurological: Negative for dizziness, tremors, seizures, syncope, light-headedness and headaches  Hematological: Negative for adenopathy  Psychiatric/Behavioral: Negative for behavioral problems  TCM Call (since 12/9/2018)     Date and time call was made  1/2/2019  2:17 PM    Hospital care reviewed  Records reviewed    Patient was hospitialized at  [facility]    Date of Admission  12/22/18    Date of discharge  12/29/18    Diagnosis  DVT, HLD, TRACY, multiple myeloma, HTN, SVA, history breast cancer, PAF, CHF, diarrhea    Disposition  Home    Were the patients medications reviewed and updated  Yes    Current Symptoms  Fatigue; Loose Stools; Shortness of breath (Several loose stools This resolved  She notes HENNING  She is o a low sodium and fluid restricted diet  )      TCM Call (since 12/9/2018)     Wound color  clear    Swelling location  clear    Should patient be enrolled in anticoag monitoring? Yes    Scheduled for follow up?   Yes    Patients specialists  Other (comment); Cardiologist (Surgical oncology - [facility] - Appt 11/6/19 - [phone])    Cardiologist name  Dr Lexii Hurst - appt - 1/10/19    Cardiologist contact #  [phone]    Other specialists names  [facility]Onc -     Other specialists contcat #  [phone]    Did you obtain your prescribed medications  Yes    Do you need help managing your prescriptions or medications  No    Is transportation to your appointment needed  Yes    I have advised the patient to call PCP with any new or worsening symptoms  Tracy Casanova RN    Living Arrangements  Spouse or Significiant other; Family members    Are you recieving home care services  Yes    Types of home care services  Nurse visit    Are you using any community resources  No    Current waiver services  No    Have you fallen in the last 12 months  No (She uses a walker)    Interperter language line needed  No    Comments  TCM appt scheduled on 1/7/19 @ 0915 in [facility] with Pauline Franklin   (Next INR due 1/7/19)            Past Medical History:   Diagnosis Date    Abdominal mass, right upper quadrant     resolved: 11/10/2016    Abnormal electrocardiogram     last assessed-11/12/2013    Actinic keratoses     last assessed-11/22/2016    Acute gastric ulcer with hemorrhage     last assessed-10/6/2015    Acute venous embolism and thrombosis of deep vessels of distal lower extremity (HCC)     Basal cell carcinoma     Breast cancer (HCC)     Cellulitis, face     last assessed-8/9/2017    Clostridium difficile colitis     resolved-9/13/2017    Congestive heart failure (CHF) (HCC)     last assessed-1/29/2015    Dyspnea     Edema     last assessed-5/18/2015    Fracture of two ribs, closed     last assessed-2/25/2014    Hematemesis/vomiting blood     last assessed-10/6/2015    Impaired glucose tolerance     last assessed-11/12/2013    Lymphedema     acquired chronic lymphedema-last assessed-11/12/2013    Malignant neoplasm of skin     last assessed-11/12/2013    Mammogram abnormal     Mental status change     Multiple myeloma ([address] )     Obstructive sleep apnea     last assessed-11/12/2013    Osteoarthritis of hip     last assessed-11/12/2013    Osteoarthritis of knee     last assessed-11/12/2013    Pulmonary embolism ([address] )     Pulmonary embolism and infarction, iatrogenic ([address] )     last assessed-4/14/2014    S/P IVC filter     last assessed-10/6/2015    Tachycardia     last assessed-11/12/2013    Type 2 diabetes mellitus ([address] )     last assessed-6/2/2014    Vitamin D deficiency     last assessed-9/23/2014 Current Outpatient Prescriptions:     allopurinol (ZYLOPRIM) 300 mg tablet, 1 tablet once a week Only to be taken prior to infusion treatments  , Disp: , Rfl:     Cholecalciferol (VITAMIN D3) 00908 units CAPS, Take 1 capsule by mouth once a week  , Disp: , Rfl:     daratumumab (DARZALEX) IVPB, 880 mg once a week  , Disp: , Rfl:     dexamethasone (DECADRON) 4 mg tablet, Take 20 mg by mouth once a week, Disp: , Rfl:     furosemide (LASIX) 40 mg tablet, Take 40 mg by mouth 2 (two) times a day  , Disp: , Rfl:     lisinopril (ZESTRIL) 5 mg tablet, Take 1 tablet (5 mg total) by mouth daily, Disp: 90 tablet, Rfl: 0    LORazepam (ATIVAN) 0 5 mg tablet, Take 1 tablet by mouth every 6 (six) hours as needed, Disp: , Rfl:     ondansetron (ZOFRAN-ODT) 8 mg disintegrating tablet, Take 8 mg by mouth as needed for nausea  , Disp: , Rfl:     Pomalidomide 4 MG CAPS, Take 4 mg by mouth daily Take 3 weeks on and 1 week off  , Disp: , Rfl:     potassium chloride (K-DUR,KLOR-CON) 20 mEq tablet, Take 20 mEq by mouth daily  , Disp: , Rfl:     prochlorperazine (COMPAZINE) 10 mg tablet, Take 10 mg by mouth as needed for nausea  , Disp: , Rfl:     senna-docusate sodium (SENOKOT-S) 8 6-50 mg per tablet, Take 1 tablet by mouth as needed for constipation  , Disp: , Rfl:     simvastatin (ZOCOR) 40 mg tablet, Take 40 mg by mouth daily at bedtime  , Disp: , Rfl:     warfarin (COUMADIN) 5 mg tablet, Take 5 mg by mouth, Disp: , Rfl:     carbamide peroxide (DEBROX) 6 5 % otic solution, Administer 5 drops into both ears 2 (two) times a day, Disp: 15 mL, Rfl: 0    ciprofloxacin (CILOXAN) 0 3 % ophthalmic solution, Administer 1 drop to both eyes every 4 (four) hours, Disp: 5 mL, Rfl: 0    Allergies   Allergen Reactions    Zoledronic Acid      Other reaction(s):  Other (See Comments) ZOMETA  Osteonecrosis of the mandible    Wound Dressing Adhesive     Adhesive  [Medical Tape] Rash    Latex Rash     RASH       Social History Past Surgical History:   Procedure Laterality Date    BONE MARROW BIOPSY      BREAST SURGERY      breast cancer [id number]    DILATION AND CURETTAGE OF UTERUS      DILATION AND CURETTAGE OF UTERUS      resolved-5/2007    HIP SURGERY      hip replacement-resolved-5/19/2009    KNEE SURGERY Right     resolved-March 2009    LYMPHADENECTOMY      lymph node removal     Family History   Problem Relation Age of Onset    No Known Problems Mother     No Known Problems Father     Hypertension Brother         benign essential HTN    Breast cancer Maternal Aunt        Objective:  /62 (BP Location: Left arm, Patient Position: Sitting, Cuff Size: Adult)   Pulse 69   Temp (!) 96 3 °F (35 7 °C) (Tympanic)   Ht 4' 8 4" (1 433 m)   Wt 52 9 kg (116 lb 9 6 oz)   SpO2 99%   BMI 25 77 kg/m²        Physical Exam   Constitutional: She is oriented to person, place, and time  She appears well-developed and well-nourished  No distress  HENT:   Head: Normocephalic and atraumatic  Right Ear: External ear normal    Left Ear: External ear normal    Nose: Nose normal    Mouth/Throat: Oropharynx is clear and moist  No oropharyngeal exudate  Bilateral cerumen impaction   Eyes: Pupils are equal, round, and reactive to light  EOM are normal  Right eye exhibits no discharge  Left eye exhibits no discharge  No scleral icterus  Crusted Yellowish discharge on her bilateral upper and lower eyelids and eyelashes, left worse than right    Conjunctiva injection, bilaterally, left worse than right   Neck: Normal range of motion  Neck supple  No JVD present  No tracheal deviation present  No thyromegaly present  Cardiovascular: Normal rate and intact distal pulses  Exam reveals no gallop and no friction rub  No murmur heard  BP recheck - 120/60  Irregular heart rate and rhythm     Pulmonary/Chest: Effort normal and breath sounds normal  No respiratory distress  She has no wheezes  She has no rales   She exhibits no tenderness  Abdominal: Soft  Bowel sounds are normal  She exhibits distension  She exhibits no mass  There is no tenderness  There is no rebound and no guarding  Musculoskeletal: Normal range of motion  She exhibits edema (3+ pitting pedal edema bilaterally extending to above her bilateral knees)  She exhibits no tenderness or deformity  Lymphadenopathy:     She has cervical adenopathy (Bilateral submandibular lymphadenopathy)  Neurological: She is alert and oriented to person, place, and time  She has normal reflexes  No cranial nerve deficit  She exhibits normal muscle tone  Coordination normal    Skin: Skin is warm and dry  No rash noted  She is not diaphoretic  No erythema  No pallor  Psychiatric: She has a normal mood and affect   Her behavior is normal

## 2019-01-09 NOTE — PATIENT INSTRUCTIONS
Warfarin (By mouth)   Warfarin (WAR-far-in)  Prevents and treats blood clots  May lower the risk of serious complications after a heart attack  This medicine is a blood thinner  Brand Name(s): Coumadin, Jantoven   There may be other brand names for this medicine  When This Medicine Should Not Be Used: This medicine is not right for everyone  Do not use it if you had an allergic reaction to warfarin, if you are pregnant, or if you have health problems that could cause bleeding  How to Use This Medicine:   Tablet  · Take your medicine as directed  Your dose may need to be changed several times to find what works best for you  · This medicine should come with a Medication Guide  Ask your pharmacist for a copy if you do not have one  · Missed dose: Take a dose as soon as you remember  If it is almost time for your next dose, wait until then and take a regular dose  Do not take extra medicine to make up for a missed dose  · Store the medicine in a closed container at room temperature, away from heat, moisture, and direct light  Drugs and Foods to Avoid:   Ask your doctor or pharmacist before using any other medicine, including over-the-counter medicines, vitamins, and herbal products  · Many medicines and foods can affect how warfarin works and may affect the PT/INR test results   Tell your doctor before you start or stop any medicine, especially the following:   ¨ Ginkgo, echinacea, goldenseal, or Salud's wort  ¨ Another blood thinner, including apixaban, argatroban, bivalirudin, cilostazol, clopidogrel, dabigatran, desirudin, dipyridamole, heparin, lepirudin, prasugrel, rivaroxaban, ticlopidine  ¨ Medicine to treat depression or anxiety, including citalopram, desvenlafaxine, duloxetine, escitalopram, fluoxetine, fluvoxamine, milnacipran, paroxetine, sertraline, venlafaxine, vilazodone  ¨ Medicine to treat an infection  ¨ NSAID pain or arthritis medicine, including aspirin, celecoxib, diclofenac, diflunisal, fenoprofen, ibuprofen, indomethacin, ketoprofen, ketorolac, mefenamic acid, naproxen, oxaprozin, piroxicam, sulindac  Check labels for over-the-counter medicines to find out if they contain an NSAID  ¨ Steroid medicine, including dexamethasone, hydrocortisone, methylprednisolone, prednisolone, prednisone  · Warfarin works best if you eat about the same amount of vitamin K every day  Foods high in vitamin K include asparagus, broccoli, brussels sprouts, cabbage, green leafy vegetables, plums, rhubarb, and canola oil  Talk to your doctor before you make changes to your normal diet  · Do not eat grapefruit or drink grapefruit juice while you are using this medicine  Warnings While Using This Medicine:   · It is not safe to take this medicine during pregnancy  It could harm an unborn baby  Tell your doctor right away if you become pregnant  Use an effective form of birth control to keep from getting pregnant during treatment and for at least 1 month after your last dose  · Tell your doctor if you are breastfeeding, or if you have kidney disease, liver disease, diabetes, heart disease, heart failure, high blood pressure, an infection, a stomach ulcer, or protein C deficiency  Also tell your doctor if you had recent surgery or an injury, or a history of stroke, anemia, severe bleeding or bruising, or problems caused by heparin use  · This medicine may cause the following problems:   ¨ Bleeding, which may be life-threatening  ¨ Gangrene (skin or tissue damage)  ¨ Calciphylaxis or calcium uremic arteriolopathy  ¨ Purple toes syndrome  · You must have a PT/INR blood test while you use this medicine to check how well your blood is clotting  Your doctor will use the test results to make sure the medicine is working properly  Keep all appointments for the PT/INR blood tests  · You may bleed or bruise more easily with warfarin   To prevent injury or cuts, do not play rough sports, be careful with sharp objects, and brush and floss your teeth gently  Peewee Gins your nose gently, and do not pick your nose  · Carry an ID card or wear a medical alert bracelet to let emergency caregivers know that you use warfarin  · Tell any doctor or dentist who treats you that you are using this medicine  You may need to stop using this medicine several days before you have surgery or medical tests  · Keep all medicine out of the reach of children  Never share your medicine with anyone  Possible Side Effects While Using This Medicine:   Call your doctor right away if you notice any of these side effects:  · Allergic reaction: Itching or hives, swelling in your face or hands, swelling or tingling in your mouth or throat, chest tightness, trouble breathing  · Bleeding from your gums or nose, coughing up blood, heavy monthly periods  · Bleeding that does not stop, bruising, or weakness  · Dizziness, fainting, lightheadedness  · Pain, brown or black skin, or skin that is cool to the touch  · Purple toes or feet, or new pain in your leg, foot, or toes  · Purplish red, net-like, blotchy spots on the skin  · Red or dark brown urine, or red or black, tarry stools  · Vomiting blood or material that looks like coffee grounds  If you notice other side effects that you think are caused by this medicine, tell your doctor  Call your doctor for medical advice about side effects  You may report side effects to FDA at 6-246-FDA-3582  © 2017 [address] Information is for End User's use only and may not be sold, redistributed or otherwise used for commercial purposes  The above information is an  only  It is not intended as medical advice for individual conditions or treatments  Talk to your doctor, nurse or pharmacist before following any medical regimen to see if it is safe and effective for you  Low-Sodium Diet   AMBULATORY CARE:   A low-sodium diet  limits foods that are high in sodium (salt)   You will need to follow a low-sodium diet if you have high blood pressure, kidney disease, or heart failure  You may also need to follow this diet if you have a condition that is causing your body to retain (hold) extra fluid  You may need to limit the amount of sodium you eat to 1,500 mg  Ask your healthcare provider how much sodium you can have each day  How to use food labels to choose foods that are low in sodium:  Read food labels to find the amount of sodium they contain  The amount of sodium is listed in milligrams (mg)  The % Daily Value (DV) column tells you how much of your daily needs are met by 1 serving of the food for each nutrient listed  Choose foods that have less than 5% of the DV of sodium  These foods are considered low in sodium  Foods that have 20% or more of the DV of sodium are considered high in sodium  Some food labels may also list any of the following terms that tell you about the sodium content in the food:  · Sodium-free:  Less than 5 mg in each serving    · Very low sodium:  35 mg of sodium or less in each serving    · Low sodium:  140 mg of sodium or less in each serving    · Reduced sodium: At least 25% less sodium in each serving than the regular type    · Light in sodium:  50% less sodium in each serving    · Unsalted or no added salt:  No extra salt is added during processing (the food may still contain sodium)  Foods to avoid:  Salty foods are high in sodium   You should avoid the following:  · Processed foods:      ¨ Mixes for cornbread, biscuits, cake, and pudding     ¨ Instant foods, such as potatoes, cereals, noodles, and rice     ¨ Packaged foods, such as bread stuffing, rice and pasta mixes, snack dip mixes, and macaroni and cheese     ¨ Canned foods, such as canned vegetables, soups, broths, sauces, and vegetable or tomato juice    ¨ Snack foods, such as salted chips, popcorn, pretzels, pork rinds, salted crackers, and salted nuts    ¨ Frozen foods, such as dinners, entrees, vegetables with sauces, and breaded meats    ¨ Sauerkraut, pickled vegetables, and other foods prepared in brine    · Meats and cheeses:      ¨ Smoked or cured meat, such as corned beef, olivera, ham, hot dogs, and sausage    ¨ Canned meats or spreads, such as potted meats, sardines, anchovies, and imitation seafood    ¨ Deli or lunch meats, such as bologna, ham, turkey, and roast beef    ¨ Processed cheese, such as American cheese and cheese spreads    · Condiments, sauces, and seasonings:      ¨ Salt (¼ teaspoon of salt contains 575 mg of sodium)    ¨ Seasonings made with salt, such as garlic salt, celery salt, onion salt, and seasoned salt    ¨ Regular soy sauce, barbecue sauce, teriyaki sauce, steak sauce, Worcestershire sauce, and most flavored vinegars    ¨ Canned gravy and mixes     ¨ Regular condiments, such as mustard, ketchup, and salad dressings    ¨ Pickles and olives    ¨ Meat tenderizers and monosodium glutamate (MSG)  Foods to include:  Read the food label to find the exact amount of sodium in each serving  · Bread and cereal:  Try to choose breads with less than 80 mg of sodium per serving  ¨ Bread, roll, miladys, tortilla, or unsalted crackers  ¨ Ready-to-eat cereals with less than 5% DV of sodium (examples include shredded wheat and puffed rice)    ¨ Pasta    · Vegetables and fruits:      ¨ Unsalted fresh, frozen, or canned vegetables    ¨ Fresh, frozen, or canned fruits    ¨ Fruit juice    · Dairy:  One serving has about 150 mg of sodium  ¨ Milk, all types    ¨ Yogurt    ¨ Hard cheese, such as cheddar, Swiss, [employer], or mozzarella    · Meat and other protein foods:  Some raw meats may have added sodium  ¨ Plain meats, fish, and poultry     ¨ Eggs    · Other foods:      ¨ Homemade pudding    ¨ Unsalted nuts, popcorn, or pretzels    ¨ Unsalted butter or margarine  Ways to decrease sodium:   · Add spices and herbs to foods instead of salt during cooking    Use salt-free seasonings to add flavor to foods  Examples include onion powder, garlic powder, basil, forbes powder, paprika, and parsley  Try lemon or lime juice or vinegar to give foods a tart flavor  Use hot peppers, pepper, or cayenne pepper to add a spicy flavor to foods  · Do not keep a salt shaker at your kitchen table  This may help keep you from adding salt to food at the table  It may take time to get used to enjoying the natural flavor of food instead of adding salt  Talk to your healthcare provider before you use salt substitutes  Some salt substitutes have a high amount of potassium and need to be avoided if you have kidney disease  · Choose low-sodium foods at restaurants  Meals from restaurants are often high in sodium  Some restaurants have nutrition information on the menu that tells you the amount of sodium in their foods  If possible, ask for your food to be prepared with less, or no salt  · Shop for unsalted or low-sodium foods and snacks at the grocery store  Examples include unsalted or low-sodium broths, soups, and canned vegetables  Choose fresh or frozen vegetables instead  Choose unsalted nuts or seeds or fresh fruits or vegetables as snacks  Read food labels and choose salt-free, very low-sodium, or low-sodium foods  © 2017 [employer]  Information is for End User's use only and may not be sold, redistributed or otherwise used for commercial purposes  All illustrations and images included in CareNotes® are the copyrighted property of [employer] , [employer]  or Nate Couch  The above information is an  only  It is not intended as medical advice for individual conditions or treatments  Talk to your doctor, nurse or pharmacist before following any medical regimen to see if it is safe and effective for you

## 2019-01-14 ENCOUNTER — APPOINTMENT (OUTPATIENT)
Dept: LAB | Facility: MEDICAL CENTER | Age: 79
End: 2019-01-14
Payer: MEDICARE

## 2019-01-14 ENCOUNTER — TRANSCRIBE ORDERS (OUTPATIENT)
Dept: ADMINISTRATIVE | Facility: HOSPITAL | Age: 79
End: 2019-01-14

## 2019-01-14 ENCOUNTER — ANTICOAG VISIT (OUTPATIENT)
Dept: INTERNAL MEDICINE CLINIC | Facility: CLINIC | Age: 79
End: 2019-01-14

## 2019-01-14 DIAGNOSIS — I48.0 PAROXYSMAL ATRIAL FIBRILLATION (HCC): ICD-10-CM

## 2019-01-14 DIAGNOSIS — Z51.11 ENCOUNTER FOR ANTINEOPLASTIC CHEMOTHERAPY: Primary | ICD-10-CM

## 2019-01-14 LAB
BASOPHILS # BLD AUTO: 0.04 THOUSANDS/ΜL (ref 0–0.1)
BASOPHILS NFR BLD AUTO: 1 % (ref 0–1)
EOSINOPHIL # BLD AUTO: 0.21 THOUSAND/ΜL (ref 0–0.61)
EOSINOPHIL NFR BLD AUTO: 6 % (ref 0–6)
ERYTHROCYTE [DISTWIDTH] IN BLOOD BY AUTOMATED COUNT: 17.9 % (ref 11.6–15.1)
HCT VFR BLD AUTO: 33.7 % (ref 34.8–46.1)
HGB BLD-MCNC: 10.2 G/DL (ref 11.5–15.4)
IMM GRANULOCYTES # BLD AUTO: 0.02 THOUSAND/UL (ref 0–0.2)
IMM GRANULOCYTES NFR BLD AUTO: 1 % (ref 0–2)
INR PPP: 1.26 (ref 0.86–1.17)
LYMPHOCYTES # BLD AUTO: 1.66 THOUSANDS/ΜL (ref 0.6–4.47)
LYMPHOCYTES NFR BLD AUTO: 42 % (ref 14–44)
MCH RBC QN AUTO: 27.9 PG (ref 26.8–34.3)
MCHC RBC AUTO-ENTMCNC: 30.3 G/DL (ref 31.4–37.4)
MCV RBC AUTO: 92 FL (ref 82–98)
MONOCYTES # BLD AUTO: 0.53 THOUSAND/ΜL (ref 0.17–1.22)
MONOCYTES NFR BLD AUTO: 14 % (ref 4–12)
NEUTROPHILS # BLD AUTO: 1.38 THOUSANDS/ΜL (ref 1.85–7.62)
NEUTS SEG NFR BLD AUTO: 36 % (ref 43–75)
NRBC BLD AUTO-RTO: 0 /100 WBCS
PLATELET # BLD AUTO: 173 THOUSANDS/UL (ref 149–390)
PMV BLD AUTO: 11.1 FL (ref 8.9–12.7)
PROTHROMBIN TIME: 15.9 SECONDS (ref 11.8–14.2)
RBC # BLD AUTO: 3.66 MILLION/UL (ref 3.81–5.12)
WBC # BLD AUTO: 3.84 THOUSAND/UL (ref 4.31–10.16)

## 2019-01-14 PROCEDURE — 85025 COMPLETE CBC W/AUTO DIFF WBC: CPT | Performed by: FAMILY MEDICINE

## 2019-01-14 PROCEDURE — 36415 COLL VENOUS BLD VENIPUNCTURE: CPT | Performed by: FAMILY MEDICINE

## 2019-01-14 PROCEDURE — 85610 PROTHROMBIN TIME: CPT | Performed by: FAMILY MEDICINE

## 2019-01-14 NOTE — PROGRESS NOTES
Spoke to pt she understands dose change 5MG today, Tue,& wed 2 5mg Thu, fri, sat,& sun  Next week 5mg m, w, f; 2 5mg all other days   knows to redraw in 2 weeks on 01/28/2018---DM-MA

## 2019-01-16 DIAGNOSIS — E78.5 HYPERLIPIDEMIA, UNSPECIFIED HYPERLIPIDEMIA TYPE: Primary | ICD-10-CM

## 2019-01-17 RX ORDER — SIMVASTATIN 40 MG
40 TABLET ORAL
Qty: 90 TABLET | Refills: 0 | Status: SHIPPED | OUTPATIENT
Start: 2019-01-17 | End: 2019-03-25 | Stop reason: SDUPTHER

## 2019-01-21 ENCOUNTER — APPOINTMENT (OUTPATIENT)
Dept: LAB | Age: 79
End: 2019-01-21
Payer: MEDICARE

## 2019-01-22 ENCOUNTER — ANTICOAG VISIT (OUTPATIENT)
Dept: INTERNAL MEDICINE CLINIC | Facility: CLINIC | Age: 79
End: 2019-01-22

## 2019-02-06 ENCOUNTER — ANTICOAG VISIT (OUTPATIENT)
Dept: INTERNAL MEDICINE CLINIC | Age: 79
End: 2019-02-06

## 2019-02-06 LAB — INR PPP: 1.73 (ref 0.86–1.17)

## 2019-02-18 ENCOUNTER — APPOINTMENT (OUTPATIENT)
Dept: LAB | Age: 79
End: 2019-02-18
Payer: MEDICARE

## 2019-02-19 ENCOUNTER — ANTICOAG VISIT (OUTPATIENT)
Dept: INTERNAL MEDICINE CLINIC | Facility: CLINIC | Age: 79
End: 2019-02-19

## 2019-02-22 DIAGNOSIS — I82.493 DEEP VEIN THROMBOSIS (DVT) OF OTHER VEIN OF BOTH LOWER EXTREMITIES, UNSPECIFIED CHRONICITY (HCC): Primary | ICD-10-CM

## 2019-02-22 RX ORDER — WARFARIN SODIUM 5 MG/1
5 TABLET ORAL
Qty: 30 TABLET | Refills: 5 | Status: SHIPPED | OUTPATIENT
Start: 2019-02-22 | End: 2019-08-15

## 2019-03-05 ENCOUNTER — APPOINTMENT (OUTPATIENT)
Dept: LAB | Facility: MEDICAL CENTER | Age: 79
End: 2019-03-05
Payer: MEDICARE

## 2019-03-05 ENCOUNTER — TRANSCRIBE ORDERS (OUTPATIENT)
Dept: ADMINISTRATIVE | Facility: HOSPITAL | Age: 79
End: 2019-03-05

## 2019-03-05 DIAGNOSIS — I48.0 PAROXYSMAL ATRIAL FIBRILLATION (HCC): Primary | ICD-10-CM

## 2019-03-05 DIAGNOSIS — Z51.11 ENCOUNTER FOR ANTINEOPLASTIC CHEMOTHERAPY: ICD-10-CM

## 2019-03-05 LAB
INR PPP: 1.37 (ref 0.86–1.17)
PROTHROMBIN TIME: 17 SECONDS (ref 11.8–14.2)

## 2019-03-05 PROCEDURE — 85610 PROTHROMBIN TIME: CPT | Performed by: FAMILY MEDICINE

## 2019-03-05 PROCEDURE — 36415 COLL VENOUS BLD VENIPUNCTURE: CPT | Performed by: FAMILY MEDICINE

## 2019-03-06 ENCOUNTER — ANTICOAG VISIT (OUTPATIENT)
Dept: INTERNAL MEDICINE CLINIC | Age: 79
End: 2019-03-06

## 2019-03-15 ENCOUNTER — TELEPHONE (OUTPATIENT)
Dept: OTHER | Facility: HOSPITAL | Age: 79
End: 2019-03-15

## 2019-03-15 ENCOUNTER — TELEPHONE (OUTPATIENT)
Dept: OTHER | Facility: OTHER | Age: 79
End: 2019-03-15

## 2019-03-15 NOTE — TELEPHONE ENCOUNTER
I got a message from Health calls regarding pt's critical lab result and I called them and was given the message that her potassium level is low at 2 7  I called her two numbers that are listed and left on one number( [phone]) but got a message that the second number () was not in service  I left a message for pt to call the office after hours call line as soon as possible

## 2019-03-18 ENCOUNTER — TELEPHONE (OUTPATIENT)
Dept: INTERNAL MEDICINE CLINIC | Facility: CLINIC | Age: 79
End: 2019-03-18

## 2019-03-18 ENCOUNTER — ANTICOAG VISIT (OUTPATIENT)
Dept: INTERNAL MEDICINE CLINIC | Facility: CLINIC | Age: 79
End: 2019-03-18

## 2019-03-18 DIAGNOSIS — E87.6 HYPOKALEMIA: Primary | ICD-10-CM

## 2019-03-18 LAB — INR PPP: 3.3 (ref 0.86–1.17)

## 2019-03-18 NOTE — TELEPHONE ENCOUNTER
LM on [phone] requesting a call back  I stressed the importance that I speak with her immediately re: her Coumadin dose and her low potassium  Unable to reach patient on her cell phone  No option to leave message

## 2019-03-18 NOTE — TELEPHONE ENCOUNTER
Patient called the office with updated information  She states that she did receive a call from her Cardiologist office on Friday 3/15/19 re: the abnormal, low, serum potassium  She has had the diarrhea for the past week and has not been feeling well  She has not left the house in a week  Approx 2-3 weeks prior to this call she was placed on KDur 20 meq, 2 tablets in the AM and 1 tablet in the PM   Her instructions on Friday were to take an additional 4 tablets (80 Meq now) and her total dose for 3/15 was 140 meq  She then went back to her Kdur 20 meq, take 2 tablets in AM and 1 in the PM   I called [facility] cardiology and spoke with [facility]  She stated that she did treat the low potassium with an addition 80 meq on 3/15 then increased the daily dose to 40 meq BID  She ordered a BMP to be done tomorrow and she has a follow up appt scheduled on 3/26/19 @ 0830 with Jose Monzon  I called Shar Suero and reviewed this information with her  She stated the [facility] services will be coming to the home tomorrow and she will have them draw the blood

## 2019-03-18 NOTE — TELEPHONE ENCOUNTER
Several messages left for patient to call the office due to low serum potassium and elevated INR  The cell phone listed is out of service  No other phone numbers or emergency contacts are listed in -Epic, [facility] and [employer]  Letter drafted and mailed to patient via [phone] mail  Dr Cuco Newby recommends a STAT K+ level

## 2019-03-18 NOTE — TELEPHONE ENCOUNTER
Several messages left for patient to call the office due to low serum potassium and elevated INR  The cell phone listed is out of service  No other phone numbers or emergency contacts are listed in -[facility], [facility] and [facility]  Letter drafted and mailed to patient  Dr Toney Dow recommends a STAT K+ level

## 2019-03-19 ENCOUNTER — DOCUMENTATION (OUTPATIENT)
Dept: INTERNAL MEDICINE CLINIC | Facility: CLINIC | Age: 79
End: 2019-03-19

## 2019-03-22 ENCOUNTER — TELEPHONE (OUTPATIENT)
Dept: INTERNAL MEDICINE CLINIC | Facility: CLINIC | Age: 79
End: 2019-03-22

## 2019-03-22 DIAGNOSIS — E78.5 HYPERLIPIDEMIA, UNSPECIFIED HYPERLIPIDEMIA TYPE: ICD-10-CM

## 2019-03-25 RX ORDER — SIMVASTATIN 40 MG
40 TABLET ORAL
Qty: 90 TABLET | Refills: 1 | Status: SHIPPED | OUTPATIENT
Start: 2019-03-25

## 2019-05-09 ENCOUNTER — TELEPHONE (OUTPATIENT)
Dept: INTERNAL MEDICINE CLINIC | Facility: CLINIC | Age: 79
End: 2019-05-09

## 2019-05-30 ENCOUNTER — TRANSITIONAL CARE MANAGEMENT (OUTPATIENT)
Dept: INTERNAL MEDICINE CLINIC | Facility: CLINIC | Age: 79
End: 2019-05-30

## 2019-06-03 LAB — INR PPP: 2.1 (ref 0.86–1.17)

## 2019-06-10 LAB — INR PPP: 1.4 (ref 0.86–1.17)

## 2019-06-13 LAB — INR PPP: 1.3 (ref 0.86–1.17)

## 2019-06-17 ENCOUNTER — TELEPHONE (OUTPATIENT)
Dept: INTERNAL MEDICINE CLINIC | Facility: CLINIC | Age: 79
End: 2019-06-17

## 2019-06-17 ENCOUNTER — TRANSITIONAL CARE MANAGEMENT (OUTPATIENT)
Dept: INTERNAL MEDICINE CLINIC | Facility: CLINIC | Age: 79
End: 2019-06-17

## 2019-06-17 RX ORDER — ERGOCALCIFEROL (VITAMIN D2) 1250 MCG
50000 CAPSULE ORAL WEEKLY
COMMUNITY
Start: 2019-05-13 | End: 2019-10-15 | Stop reason: SDUPTHER

## 2019-06-17 RX ORDER — MIDODRINE HYDROCHLORIDE 5 MG/1
5 TABLET ORAL
COMMUNITY
Start: 2019-05-29 | End: 2019-06-28

## 2019-06-17 RX ORDER — TORSEMIDE 20 MG/1
20 TABLET ORAL
COMMUNITY
Start: 2019-05-28

## 2019-06-17 RX ORDER — LEVETIRACETAM 500 MG/1
500 TABLET ORAL 2 TIMES DAILY
COMMUNITY
Start: 2019-06-01 | End: 2020-05-31

## 2019-06-17 RX ORDER — ACETAMINOPHEN 500 MG
500 TABLET ORAL EVERY 6 HOURS PRN
COMMUNITY

## 2019-06-17 RX ORDER — PANTOPRAZOLE SODIUM 40 MG/1
40 TABLET, DELAYED RELEASE ORAL 2 TIMES DAILY
COMMUNITY
Start: 2019-05-10

## 2019-06-17 RX ORDER — BISACODYL 10 MG
10 SUPPOSITORY, RECTAL RECTAL
COMMUNITY

## 2019-06-17 RX ORDER — MIRTAZAPINE 30 MG/1
30 TABLET, FILM COATED ORAL
COMMUNITY

## 2019-06-17 RX ORDER — FERROUS SULFATE 325(65) MG
325 TABLET ORAL
COMMUNITY

## 2019-06-17 RX ORDER — BALSAM PERU/CASTOR OIL
OINTMENT (GRAM) TOPICAL 2 TIMES DAILY
COMMUNITY
Start: 2019-05-10

## 2019-06-17 RX ORDER — ONDANSETRON HYDROCHLORIDE 8 MG/1
TABLET, FILM COATED ORAL EVERY 8 HOURS PRN
COMMUNITY

## 2019-06-18 ENCOUNTER — TELEPHONE (OUTPATIENT)
Dept: INTERNAL MEDICINE CLINIC | Age: 79
End: 2019-06-18

## 2019-06-20 ENCOUNTER — TRANSCRIBE ORDERS (OUTPATIENT)
Dept: ADMINISTRATIVE | Facility: HOSPITAL | Age: 79
End: 2019-06-20

## 2019-06-20 ENCOUNTER — APPOINTMENT (OUTPATIENT)
Dept: LAB | Facility: MEDICAL CENTER | Age: 79
End: 2019-06-20
Payer: MEDICARE

## 2019-06-20 DIAGNOSIS — Z79.01 LONG TERM (CURRENT) USE OF ANTICOAGULANTS: Primary | ICD-10-CM

## 2019-06-20 LAB
INR PPP: 2.45 (ref 0.86–1.17)
PROTHROMBIN TIME: 26.6 SECONDS (ref 11.8–14.2)

## 2019-06-20 PROCEDURE — 36415 COLL VENOUS BLD VENIPUNCTURE: CPT | Performed by: FAMILY MEDICINE

## 2019-06-20 PROCEDURE — 85610 PROTHROMBIN TIME: CPT | Performed by: FAMILY MEDICINE

## 2019-06-21 ENCOUNTER — ANTICOAG VISIT (OUTPATIENT)
Dept: INTERNAL MEDICINE CLINIC | Facility: CLINIC | Age: 79
End: 2019-06-21

## 2019-06-26 ENCOUNTER — TELEPHONE (OUTPATIENT)
Dept: INTERNAL MEDICINE CLINIC | Age: 79
End: 2019-06-26

## 2019-07-01 ENCOUNTER — TELEPHONE (OUTPATIENT)
Dept: INTERNAL MEDICINE CLINIC | Facility: CLINIC | Age: 79
End: 2019-07-01

## 2019-07-01 LAB — INR PPP: 1.7 (ref 0.84–1.19)

## 2019-07-01 NOTE — TELEPHONE ENCOUNTER
I called and left Voicemail for [employer] and [facility] supervisor, Turner Yung,  to call back to discuss Dr Maciel Arreola orders for Continuous IV and blood work  I also called Angelique's home and spoke with daughter Subhash Cervantes  She states that her father is back in the hospital for uncontrolled pain  Subhash Cervantes arrived over the weekend to find both parents in need of care  Her mother is soiling the sofa with stool and in need of personal hygiene  She is not eating, drinking or able to care for herself  Michael Echols continues to refuse hospitalization and Subhash Cevrantes is attempting to convince her that she is in need of IV hydration  She is in need of blood work testing and [facility] has not been able to successfully draw her INR  Radha Query will call with updates through out the day

## 2019-07-01 NOTE — TELEPHONE ENCOUNTER
She is currently admitted to  [facility]  Jaqueline Fry will keep me updated

## 2019-07-01 NOTE — TELEPHONE ENCOUNTER
Could you try calling her  or see if [facility] can take a stool sample for  c diff and labs for cbc, comp and INR    Also if they can start an IV or NSS at 50 cc per hour

## 2019-07-01 NOTE — TELEPHONE ENCOUNTER
This AM I retrieved a voicemail message left on Friday at 1 from [facility] with a condition update for [facility]  She reports that she is becoming dehydrated and refuses to go to the ER  She is not taking her medications including her Coumadin  She has liquid diarrhea  No further information given on voicemail  I tried to call [facility] for additional information and got her VM  Message left asking her to fatemeh with an update  I do see that she has a wound care visit today

## 2019-07-02 LAB — INR PPP: 2.4 (ref 0.84–1.19)

## 2019-07-03 ENCOUNTER — PATIENT OUTREACH (OUTPATIENT)
Dept: INTERNAL MEDICINE CLINIC | Facility: CLINIC | Age: 79
End: 2019-07-03

## 2019-07-03 DIAGNOSIS — Z71.89 COMPLEX CARE COORDINATION: Primary | ICD-10-CM

## 2019-07-03 LAB — INR PPP: 2.6 (ref 0.84–1.19)

## 2019-07-03 NOTE — PROGRESS NOTES
Outpatient Care Management Note:  RE: Received referral on pt from [facility] RN   [facility]

## 2019-07-05 ENCOUNTER — TRANSITIONAL CARE MANAGEMENT (OUTPATIENT)
Dept: INTERNAL MEDICINE CLINIC | Facility: CLINIC | Age: 79
End: 2019-07-05

## 2019-07-05 LAB — INR PPP: 2.8 (ref 0.84–1.19)

## 2019-07-08 LAB — INR PPP: 2.6 (ref 0.84–1.19)

## 2019-07-11 LAB — INR PPP: 2.6 (ref 0.84–1.19)

## 2019-07-18 LAB — INR PPP: 2 (ref 0.84–1.19)

## 2019-07-25 LAB — INR PPP: 1.5 (ref 0.84–1.19)

## 2019-07-29 ENCOUNTER — TELEPHONE (OUTPATIENT)
Dept: INTERNAL MEDICINE CLINIC | Age: 79
End: 2019-07-29

## 2019-07-29 LAB — INR PPP: 1.4 (ref 0.84–1.19)

## 2019-07-29 NOTE — TELEPHONE ENCOUNTER
TCM scheduled for 08/07/19 at 3:00 p m  in the [facility]  Patient discharging 8/2/19 from [facility], admitted for rehab and recurring bouts of C  difficile

## 2019-08-01 LAB — INR PPP: 1.5 (ref 0.84–1.19)

## 2019-08-02 ENCOUNTER — PATIENT OUTREACH (OUTPATIENT)
Dept: INTERNAL MEDICINE CLINIC | Facility: CLINIC | Age: 79
End: 2019-08-02

## 2019-08-02 NOTE — PROGRESS NOTES
Outpatient Care Management Note:  RE:Chart review completed noting that pt is being discharged today from SNF  Will follow with her next week

## 2019-08-05 ENCOUNTER — TRANSITIONAL CARE MANAGEMENT (OUTPATIENT)
Dept: INTERNAL MEDICINE CLINIC | Facility: CLINIC | Age: 79
End: 2019-08-05

## 2019-08-05 RX ORDER — MIDODRINE HYDROCHLORIDE 10 MG/1
10 TABLET ORAL 3 TIMES DAILY
COMMUNITY
Start: 2019-07-05

## 2019-08-05 RX ORDER — NICOTINE POLACRILEX 4 MG/1
1 GUM, CHEWING ORAL DAILY
COMMUNITY
End: 2019-10-15 | Stop reason: SDUPTHER

## 2019-08-05 RX ORDER — WARFARIN SODIUM 5 MG/1
5 TABLET ORAL DAILY
COMMUNITY
End: 2019-08-20 | Stop reason: DRUGHIGH

## 2019-08-06 ENCOUNTER — ANTICOAG VISIT (OUTPATIENT)
Dept: INTERNAL MEDICINE CLINIC | Facility: CLINIC | Age: 79
End: 2019-08-06

## 2019-08-06 ENCOUNTER — PATIENT OUTREACH (OUTPATIENT)
Dept: INTERNAL MEDICINE CLINIC | Age: 79
End: 2019-08-06

## 2019-08-06 NOTE — PROGRESS NOTES
Outpatient Care Management Note:  RE:Received call back from pt's daughter, Donnell Perdue, who states that she was never told by [facility] about her mother's TCM appt that is for tomorrow  Daughter states that she is unable to bring her mother because of only having a pickup truck to use  She will need to borrow a different vehicle to bring her to the office and will call for an appt for next week  [address] home health is currently coming into the home (PT, wound care and OT) for pt and her    was also hospitalized and in [facility] but came home a couple weeks before pt  Daughter reports that pt is getting around in the one level home by using walker  She denies any issues with meds or refills and 'sat down with the nurse to go over all the meds'  Then received a call from pt  who stated that she heard my message and was returning the call  She reported that she would like to come in next week to see a dr  Because she would like to know if her meds could be reduced in quantity  She is 'concerned about destroying her stomach' with many pills  She also requested that PCP office nurse Karla Alonzo call her to discuss Coumadin

## 2019-08-06 NOTE — PROGRESS NOTES
Outpatient Care Management Note:  RE:Called and left a message for return outreach  Noted that pt discharged from [facility] last week  Has TCM tomorrow

## 2019-08-08 ENCOUNTER — TRANSCRIBE ORDERS (OUTPATIENT)
Dept: ADMINISTRATIVE | Facility: HOSPITAL | Age: 79
End: 2019-08-08

## 2019-08-08 ENCOUNTER — TELEPHONE (OUTPATIENT)
Dept: INTERNAL MEDICINE CLINIC | Facility: CLINIC | Age: 79
End: 2019-08-08

## 2019-08-08 ENCOUNTER — APPOINTMENT (OUTPATIENT)
Dept: LAB | Facility: MEDICAL CENTER | Age: 79
End: 2019-08-08
Payer: MEDICARE

## 2019-08-08 DIAGNOSIS — Z79.01 LONG TERM (CURRENT) USE OF ANTICOAGULANTS: ICD-10-CM

## 2019-08-08 DIAGNOSIS — I48.20 CHRONIC ATRIAL FIBRILLATION (HCC): ICD-10-CM

## 2019-08-08 DIAGNOSIS — D64.9 ANEMIA, UNSPECIFIED TYPE: Primary | ICD-10-CM

## 2019-08-08 LAB
ERYTHROCYTE [DISTWIDTH] IN BLOOD BY AUTOMATED COUNT: 17 % (ref 11.6–15.1)
HCT VFR BLD AUTO: 28.8 % (ref 34.8–46.1)
HGB BLD-MCNC: 8.7 G/DL (ref 11.5–15.4)
MCH RBC QN AUTO: 28.5 PG (ref 26.8–34.3)
MCHC RBC AUTO-ENTMCNC: 30.2 G/DL (ref 31.4–37.4)
MCV RBC AUTO: 94 FL (ref 82–98)
PLATELET # BLD AUTO: 244 THOUSANDS/UL (ref 149–390)
PMV BLD AUTO: 10 FL (ref 8.9–12.7)
RBC # BLD AUTO: 3.05 MILLION/UL (ref 3.81–5.12)
WBC # BLD AUTO: 6.32 THOUSAND/UL (ref 4.31–10.16)

## 2019-08-08 PROCEDURE — 85027 COMPLETE CBC AUTOMATED: CPT | Performed by: FAMILY MEDICINE

## 2019-08-08 NOTE — TELEPHONE ENCOUNTER
----- Message from Jackson Rod DO sent at 8/7/2019 12:07 PM EDT -----  Who was the ordering physician        ----- Message -----  From: Edson Ardon RN  Sent: 8/7/2019  10:05 AM EDT  To: Jackson Rod DO    [facility]

## 2019-08-09 NOTE — PROGRESS NOTES
Patients PT INR 1 2, patient is to take 2mg of coumadin on tues and Thursday and 3mg on all other days, please have her recheck PT INR in one week   ------

## 2019-08-12 ENCOUNTER — TELEPHONE (OUTPATIENT)
Dept: INTERNAL MEDICINE CLINIC | Facility: CLINIC | Age: 79
End: 2019-08-12

## 2019-08-12 NOTE — TELEPHONE ENCOUNTER
[facility] [id number] [facility]

## 2019-08-13 ENCOUNTER — TELEPHONE (OUTPATIENT)
Dept: INTERNAL MEDICINE CLINIC | Facility: CLINIC | Age: 79
End: 2019-08-13

## 2019-08-13 NOTE — TELEPHONE ENCOUNTER
Wilson Nyhan with [facility] home care called has been receiving physical therapy her 60 day period ends this week and they want to continue home physical therapy 2 times a week until next certification period   Please call frandy at [phone]

## 2019-08-15 ENCOUNTER — OFFICE VISIT (OUTPATIENT)
Dept: INTERNAL MEDICINE CLINIC | Facility: CLINIC | Age: 79
End: 2019-08-15
Payer: MEDICARE

## 2019-08-15 VITALS
BODY MASS INDEX: 23.89 KG/M2 | OXYGEN SATURATION: 94 % | HEART RATE: 64 BPM | TEMPERATURE: 99.4 F | HEIGHT: 56 IN | SYSTOLIC BLOOD PRESSURE: 124 MMHG | DIASTOLIC BLOOD PRESSURE: 72 MMHG | WEIGHT: 106.2 LBS

## 2019-08-15 DIAGNOSIS — L89.612 PRESSURE INJURY OF RIGHT HEEL, STAGE 2 (HCC): ICD-10-CM

## 2019-08-15 DIAGNOSIS — A04.72 C. DIFFICILE COLITIS: ICD-10-CM

## 2019-08-15 DIAGNOSIS — Z79.01 ENCOUNTER FOR CURRENT LONG-TERM USE OF ANTICOAGULANTS: ICD-10-CM

## 2019-08-15 DIAGNOSIS — R60.0 BILATERAL EDEMA OF LOWER EXTREMITY: ICD-10-CM

## 2019-08-15 DIAGNOSIS — R55 SYNCOPE, UNSPECIFIED SYNCOPE TYPE: ICD-10-CM

## 2019-08-15 DIAGNOSIS — I10 BENIGN ESSENTIAL HYPERTENSION: ICD-10-CM

## 2019-08-15 DIAGNOSIS — D64.81 ANEMIA ASSOCIATED WITH CHEMOTHERAPY: ICD-10-CM

## 2019-08-15 DIAGNOSIS — C90.00 MULTIPLE MYELOMA NOT HAVING ACHIEVED REMISSION (HCC): ICD-10-CM

## 2019-08-15 DIAGNOSIS — Z79.01 CHRONIC ANTICOAGULATION: ICD-10-CM

## 2019-08-15 DIAGNOSIS — M81.0 OSTEOPOROSIS, UNSPECIFIED OSTEOPOROSIS TYPE, UNSPECIFIED PATHOLOGICAL FRACTURE PRESENCE: ICD-10-CM

## 2019-08-15 DIAGNOSIS — I42.0 DILATED CONGESTIVE CARDIOMYOPATHY (HCC): ICD-10-CM

## 2019-08-15 DIAGNOSIS — K21.9 GASTROESOPHAGEAL REFLUX DISEASE WITHOUT ESOPHAGITIS: ICD-10-CM

## 2019-08-15 DIAGNOSIS — T45.1X5A ANEMIA ASSOCIATED WITH CHEMOTHERAPY: ICD-10-CM

## 2019-08-15 DIAGNOSIS — F41.9 ANXIETY: ICD-10-CM

## 2019-08-15 DIAGNOSIS — I63.411 CEREBROVASCULAR ACCIDENT (CVA) DUE TO EMBOLISM OF RIGHT MIDDLE CEREBRAL ARTERY (HCC): ICD-10-CM

## 2019-08-15 DIAGNOSIS — E55.9 VITAMIN D DEFICIENCY: ICD-10-CM

## 2019-08-15 DIAGNOSIS — I50.22 CHRONIC SYSTOLIC HEART FAILURE, ACC/AHA STAGE C (HCC): ICD-10-CM

## 2019-08-15 DIAGNOSIS — R26.2 AMBULATORY DYSFUNCTION: ICD-10-CM

## 2019-08-15 DIAGNOSIS — L89.153 PRESSURE INJURY OF SACRAL REGION, STAGE 3 (HCC): ICD-10-CM

## 2019-08-15 DIAGNOSIS — I82.403 ACUTE DEEP VEIN THROMBOSIS (DVT) OF BOTH LOWER EXTREMITIES, UNSPECIFIED VEIN (HCC): ICD-10-CM

## 2019-08-15 DIAGNOSIS — E78.00 PURE HYPERCHOLESTEROLEMIA: Primary | ICD-10-CM

## 2019-08-15 PROBLEM — H10.33 ACUTE BACTERIAL CONJUNCTIVITIS OF BOTH EYES: Status: RESOLVED | Noted: 2019-01-09 | Resolved: 2019-08-15

## 2019-08-15 PROBLEM — H61.23 BILATERAL IMPACTED CERUMEN: Status: RESOLVED | Noted: 2019-01-09 | Resolved: 2019-08-15

## 2019-08-15 PROBLEM — R79.1 SUPRATHERAPEUTIC INR: Status: RESOLVED | Noted: 2019-01-09 | Resolved: 2019-08-15

## 2019-08-15 PROCEDURE — 99495 TRANSJ CARE MGMT MOD F2F 14D: CPT | Performed by: FAMILY MEDICINE

## 2019-08-15 NOTE — PROGRESS NOTES
Assessment/Plan:    No problem-specific Assessment & Plan notes found for this encounter  Problem List Items Addressed This Visit        Digestive    Acid reflux    C  difficile colitis       Cardiovascular and Mediastinum    Benign essential hypertension    Cerebrovascular accident (CVA) due to embolism of right middle cerebral artery (HCC)    Chronic systolic heart failure, ACC/AHA stage C (HCC)    Relevant Orders    NT-BNP PRO    Dilated congestive cardiomyopathy ([facility] )    DVT of lower extremity, bilateral (HCC)    Syncope    Relevant Orders    Levetiracetam level       Musculoskeletal and Integument    Osteoporosis    Pressure injury of sacral region, stage 3 (HCC)    Relevant Medications    miconazole 2 % cream    Other Relevant Orders    Durable Medical Equipment    Pressure injury of right heel, stage 2    Relevant Medications    miconazole 2 % cream    Other Relevant Orders    Durable Medical Equipment       Other    Anemia associated with chemotherapy    Relevant Orders    CBC and differential    Iron    Ferritin    Comprehensive metabolic panel    Anxiety    Bilateral edema of lower extremity    Chronic anticoagulation    Encounter for current long-term use of anticoagulants    Hyperlipidemia - Primary    Relevant Orders    Lipid panel    Multiple myeloma not having achieved remission ([facility] [address] )      Other Visit Diagnoses     Ambulatory dysfunction        Relevant Orders    Ambulatory referral to Physical Therapy    Vitamin D deficiency        Relevant Orders    Vitamin D 25 hydroxy            Discussion, hospital records reviewed and last office visit from her specialist reviewed  Patient is here with daughter today  Questions and concerns addressed  Medication list from her visiting nurse was reviewed today and are list was updated  Several medications have changed  We are following her Coumadin that is subtherapeutic right now    Patient was given a prescription for roll her cushion due to sacral decubitus as well as copy of the list of medications from her visiting nurse today  I have also referred her for continued physical therapy  She is aware and will make her appointment for Hematology Oncology and wound [place] as well as neurology  I advised increase protein intake for wound healing  All questions and concerns addressed today from Jin Corona as well as her daughter  Jin Corona is to return in approximately 6 months with repeat blood work or sooner if needed  Continue with Coumadin as directed as well as completing vancomycin  Laboratory data reviewed  Subjective:      Patient ID: Karen Peraza is a [de-identified] y o  female  HPI   (Hospitalization at [facility] May 2019 for syncope, Rehabilitation at [facility], Hospitalization at [facility] July 2019 for diarrhea, Rehabilation at [facility] until 8/9/19  Patient does not know the name of any medications that she is taking )  Update since being home  She feels that she is getting stronger and is doing physical therapy at home  Has not required occupational therapy  Was to follow up with wound care which she saw twice but has to make another appointment  She has a sacral decubitus as well as right heel decubitus  Has been getting visiting nurses and several medications have been changed  She has had 20 lb weight loss over the past 1 year  She lives at home with her  and her daughter is 45 minutes away who checks up on them regularly and sometimes every day  Jin Corona currently does not drive and does not have any plans to do so  She was transitioned back to Coumadin due to some questionable failure of Eliquis after scanning of her lower extremities  She has to make her follow-up appointments with Neurology and Hematology-Oncology and wound [place] which she missed due to being in a rehab  She has gained some weight and some her appetite back since being home  She is currently off chemotherapy due to being on vancomycin for C diff colitis  She states her stools are getting better  Lalito Velasquez states that her endurance and stamina as been slowly improving as well and she was able to do the laundry yesterday  She has very mild dyspnea on exertion which she states is hard normal      Hyperlipidemia (Follow-Up): The patient states her hyperlipidemia has been under good control since the last visit  Comorbid Illnesses: hypertension       Interval Events: hospitalization for multiple myeloma/pathologic L tibial fracture     Symptoms:    Hypertension (Follow-Up): The patient states she has been stable with her blood pressure control since the last visit  Comorbid Illnesses: cardiac failure       Interval Events: none  She has no significant interval events     Symptoms: denies impaired vision,-- denies dyspnea,-- denies chest pain,-- denies intermittent leg claudication-- and-- improved lower extremity edema  Associated symptoms include no headache,-- no focal neurologic deficits-- and-- no memory loss       Home monitoring: The patient is not checking blood pressure at home       Medications: the patient is not adherent with her medication regimen  -- She denies medication side effects  The patient is due for a lipid panel,-- a serum creatinine-- and-- an eye exam       Congestive Heart Failure (Follow-Up): The patient presents for follow-up of systolic heart failure  The patient is NYHA functional Class II  The patient states she has been stable with her heart failure symptoms since the last visit       Interval Events: 6/2/14 did not take lasix for today bc she had a dentist appt in am  she changes her timings a lot for the lasix on her own      Last echocardiogram September 2017  Has habit of skipping her Lasix     Multiple Myeloma: The patient is being seen for a consultation regarding and off zometa x 2 months due to 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, had dental care with anbx and on IV anbx for infectoin  multiple myeloma  Initial presentation was 4 month(s) ago  Presentation included pathological fracture  Current diagnosis was determined by bone marrow biopsy  Past evaluation has included complete blood count  Doing well with infusion treatment for multiple myeloma  Very stable over the past 2 years          The following portions of the patient's history were reviewed and updated as appropriate: allergies, current medications, past family history, past medical history, past social history, past surgical history and problem list     Review of Systems      Constitutional:  Denies fever or chills 20 lb weight loss over the past 1 year due to illnesses   Eyes:  Denies change in visual acuity   HENT:  Denies nasal congestion or sore throat   Respiratory:  Denies cough or shortness of breath or wheezing  Cardiovascular:  Denies palpitations or chest pain  GI:  Denies abdominal pain, nausea, or vomiting  Integument:  Denies rash   Neurologic:  Denies headache or focal weakness no dizziness          Objective:      /72 (BP Location: Left arm, Patient Position: Sitting, Cuff Size: Standard)   Pulse 64   Temp 99 4 °F (37 4 °C) (Oral)   Ht 4' 8" (1 422 m)   Wt 48 2 kg (106 lb 3 2 oz)   SpO2 94%   BMI 23 81 kg/m²          Physical Exam      Constitutional:  Well developed, well nourished, no acute distress, non-toxic appearance , thin, patient is aware of her results from hospitalizations and the course  Eyes:  PERRL, conjunctiva normal , non icteric sclera  HENT:  Atraumatic, oropharynx moist  Neck-  supple   Respiratory:  CTA b/l, normal breath sounds, no rales, no wheezing   Cardiovascular:  RRR, no murmurs, no LE edema b/l  GI:  Soft, nondistended, normal bowel sounds x 4, nontender, no organomegaly, no mass, no rebound, no guarding   Neurologic:  no focal deficits noted   Psychiatric:  Speech and behavior appropriate , AAO x 3  Musculoskeletal, walking with a walker and very stable and doing well  Skin, stage III coccyx sacral decubitus noted    Healing with granulation tissue and no signs of infection  Right heel with stage I wound noted  Healing with no signs of infection

## 2019-08-19 ENCOUNTER — APPOINTMENT (OUTPATIENT)
Dept: LAB | Facility: MEDICAL CENTER | Age: 79
End: 2019-08-19
Payer: MEDICARE

## 2019-08-19 ENCOUNTER — TRANSCRIBE ORDERS (OUTPATIENT)
Dept: ADMINISTRATIVE | Facility: HOSPITAL | Age: 79
End: 2019-08-19

## 2019-08-19 DIAGNOSIS — Z79.01 LONG TERM (CURRENT) USE OF ANTICOAGULANTS: Primary | ICD-10-CM

## 2019-08-19 LAB
INR PPP: 1.18 (ref 0.84–1.19)
PROTHROMBIN TIME: 14.6 SECONDS (ref 11.6–14.5)

## 2019-08-19 PROCEDURE — 36415 COLL VENOUS BLD VENIPUNCTURE: CPT | Performed by: FAMILY MEDICINE

## 2019-08-19 PROCEDURE — 85610 PROTHROMBIN TIME: CPT | Performed by: FAMILY MEDICINE

## 2019-08-20 ENCOUNTER — ANTICOAG VISIT (OUTPATIENT)
Dept: INTERNAL MEDICINE CLINIC | Facility: CLINIC | Age: 79
End: 2019-08-20

## 2019-08-20 DIAGNOSIS — I82.403 ACUTE DEEP VEIN THROMBOSIS (DVT) OF BOTH LOWER EXTREMITIES, UNSPECIFIED VEIN (HCC): Primary | ICD-10-CM

## 2019-08-20 RX ORDER — WARFARIN SODIUM 1 MG/1
TABLET ORAL
Qty: 90 TABLET | Refills: 1 | Status: SHIPPED | OUTPATIENT
Start: 2019-08-20 | End: 2019-11-12

## 2019-08-21 DIAGNOSIS — I26.99 PULMONARY EMBOLISM WITHOUT ACUTE COR PULMONALE, UNSPECIFIED CHRONICITY, UNSPECIFIED PULMONARY EMBOLISM TYPE (HCC): Primary | ICD-10-CM

## 2019-08-21 RX ORDER — WARFARIN SODIUM 2 MG/1
TABLET ORAL
Qty: 90 TABLET | Refills: 1 | Status: SHIPPED | OUTPATIENT
Start: 2019-08-21 | End: 2019-11-12

## 2019-08-27 ENCOUNTER — ANTICOAG VISIT (OUTPATIENT)
Dept: INTERNAL MEDICINE CLINIC | Facility: CLINIC | Age: 79
End: 2019-08-27

## 2019-08-27 LAB — INR PPP: 1.1 (ref 0.84–1.19)

## 2019-09-06 ENCOUNTER — ANTICOAG VISIT (OUTPATIENT)
Dept: INTERNAL MEDICINE CLINIC | Facility: CLINIC | Age: 79
End: 2019-09-06

## 2019-09-06 LAB — INR PPP: 1.1 (ref 0.84–1.19)

## 2019-09-06 NOTE — PROGRESS NOTES
Spoke with nurse Kathy Brown  Last weeks pill box was filled incorrectly with 3 mg tablets daily instead of 5 mg daily    She will change the dose to 4 mg daily and reeat INR on Friday 9/13/19  Avonne Castleman

## 2019-09-06 NOTE — PROGRESS NOTES
Please discuss with patient's nurse the importance of correct dosing of Coumadin,   For simplicity patient is to take 4 mg of Coumadin daily and repeat PT INR in 1 week

## 2019-09-06 NOTE — PROGRESS NOTES
I called her home care nurse Jackelyn Ghotra  She fills the pill boxes for [facility]  Each time she makes a home visit, the pills up to that day have been taken or missing from the pill box  She has not had any suspicion of the pills being discarded and not taken  She is cuarrently filling the box with 5 mg daily

## 2019-09-06 NOTE — PROGRESS NOTES
I received a call from nurse [facility]  She went to the home to check on the Coumadin bottles that she used to fill the pill boxes last week  She is calling to let us know that she filled the box with 3 mg and not the 5 mg as ordered  This explains the decrease in the INR from last week

## 2019-09-09 ENCOUNTER — TELEPHONE (OUTPATIENT)
Dept: INTERNAL MEDICINE CLINIC | Facility: CLINIC | Age: 79
End: 2019-09-09

## 2019-09-13 ENCOUNTER — ANTICOAG VISIT (OUTPATIENT)
Dept: INTERNAL MEDICINE CLINIC | Facility: CLINIC | Age: 79
End: 2019-09-13

## 2019-09-13 ENCOUNTER — APPOINTMENT (OUTPATIENT)
Dept: LAB | Age: 79
End: 2019-09-13
Payer: MEDICARE

## 2019-09-13 DIAGNOSIS — Z79.01 LONG TERM (CURRENT) USE OF ANTICOAGULANTS: ICD-10-CM

## 2019-09-13 LAB
INR PPP: 1.11 (ref 0.84–1.19)
PROTHROMBIN TIME: 13.9 SECONDS (ref 11.6–14.5)

## 2019-09-13 PROCEDURE — 85610 PROTHROMBIN TIME: CPT

## 2019-09-13 PROCEDURE — 36415 COLL VENOUS BLD VENIPUNCTURE: CPT

## 2019-09-16 ENCOUNTER — PATIENT OUTREACH (OUTPATIENT)
Dept: INTERNAL MEDICINE CLINIC | Facility: CLINIC | Age: 79
End: 2019-09-16

## 2019-09-16 NOTE — PROGRESS NOTES
Outpatient Care Management Note:  RE:Pt declines Care Management  She states that she is going to the [address] [facility] for 2 small wounds that are healing  She believes that only skilled nursing is coming into the home at this time

## 2019-09-17 ENCOUNTER — TELEPHONE (OUTPATIENT)
Dept: INTERNAL MEDICINE CLINIC | Facility: CLINIC | Age: 79
End: 2019-09-17

## 2019-09-17 NOTE — TELEPHONE ENCOUNTER
Ken[facility] from [facility] calling to notify us that patient has been gaining weight  She has gained 12 pounds from 9/3-9/16  Christa Ferrer has called the patients cardiologist and they do not want to make any changes to medication until the patient is seen by them on 9/24/19  Patients legs are swollen and she stated that she has plus 3 right lower leg edema and plus 2 left lower leg edema  She is followed by wound care on Monday, Wednesday, and Friday  Kenisha was not sure if we wanted to do anything for her prior to her cardiologist appointment  She can be reached at [phone]

## 2019-09-17 NOTE — TELEPHONE ENCOUNTER
I left a message for the daughter to call  I also called Marisela Sherman  She does not feel that she needs to come into the office  She wants to see Dr Daniela Manuel first and if he wants her to see the PCP, then she will agree to come in  She reports that the swelling in her legs is greater on the right side  She agreed to call the office and come in if her condition worsens and she keeps gaining weight, or if she develops dyspnea  She would not schedule at the time of this call

## 2019-09-20 ENCOUNTER — APPOINTMENT (OUTPATIENT)
Dept: LAB | Age: 79
End: 2019-09-20
Payer: MEDICARE

## 2019-09-20 ENCOUNTER — ANTICOAG VISIT (OUTPATIENT)
Dept: INTERNAL MEDICINE CLINIC | Facility: CLINIC | Age: 79
End: 2019-09-20

## 2019-09-25 ENCOUNTER — TELEPHONE (OUTPATIENT)
Dept: INTERNAL MEDICINE CLINIC | Facility: CLINIC | Age: 79
End: 2019-09-25

## 2019-09-25 NOTE — TELEPHONE ENCOUNTER
I received a call from [facility] nurse Yves Joseph  She was calling to let me know that she did not draw the INR today as planned  Saida Hand admitted to her that she has not been taking her Coumadin regularly  Yves Joseph prepares the medication in a daily pill planner and Saida Hand has been taking the meds out and hiding them in tissues  This was discussed with Dr Domonique Fontaine at her office visit earlier this week and once again he is discussing changing her to Eliquis  IN the past, Saida Hand was not able to afford this medication and this is why she was changed back to Coumadin  Yves Joseph will try to get accurate information from Saida Hand on Monday and draw the blood if she is comfortable with [address]

## 2019-09-30 ENCOUNTER — ANTICOAG VISIT (OUTPATIENT)
Dept: INTERNAL MEDICINE CLINIC | Facility: CLINIC | Age: 79
End: 2019-09-30

## 2019-09-30 ENCOUNTER — TRANSCRIBE ORDERS (OUTPATIENT)
Dept: LAB | Age: 79
End: 2019-09-30

## 2019-09-30 ENCOUNTER — APPOINTMENT (OUTPATIENT)
Dept: LAB | Age: 79
End: 2019-09-30
Payer: MEDICARE

## 2019-09-30 DIAGNOSIS — Z79.01 LONG TERM (CURRENT) USE OF ANTICOAGULANTS: ICD-10-CM

## 2019-09-30 DIAGNOSIS — I48.0 PAROXYSMAL ATRIAL FIBRILLATION (HCC): Primary | ICD-10-CM

## 2019-09-30 DIAGNOSIS — I48.0 PAROXYSMAL ATRIAL FIBRILLATION (HCC): ICD-10-CM

## 2019-09-30 LAB
INR PPP: 2.85 (ref 0.84–1.19)
PROTHROMBIN TIME: 29.4 SECONDS (ref 11.6–14.5)

## 2019-09-30 PROCEDURE — 36415 COLL VENOUS BLD VENIPUNCTURE: CPT

## 2019-09-30 PROCEDURE — 85610 PROTHROMBIN TIME: CPT

## 2019-10-09 ENCOUNTER — ANTICOAG VISIT (OUTPATIENT)
Dept: INTERNAL MEDICINE CLINIC | Facility: CLINIC | Age: 79
End: 2019-10-09

## 2019-10-09 ENCOUNTER — APPOINTMENT (OUTPATIENT)
Dept: LAB | Age: 79
End: 2019-10-09
Payer: MEDICARE

## 2019-10-09 DIAGNOSIS — Z79.01 LONG TERM (CURRENT) USE OF ANTICOAGULANTS: ICD-10-CM

## 2019-10-09 DIAGNOSIS — I48.0 PAROXYSMAL ATRIAL FIBRILLATION (HCC): ICD-10-CM

## 2019-10-09 LAB
INR PPP: 1.53 (ref 0.84–1.19)
PROTHROMBIN TIME: 17.9 SECONDS (ref 11.6–14.5)

## 2019-10-09 PROCEDURE — 85610 PROTHROMBIN TIME: CPT

## 2019-10-09 PROCEDURE — 36415 COLL VENOUS BLD VENIPUNCTURE: CPT

## 2019-10-10 NOTE — PROGRESS NOTES
It appears pt is on coumadin 7mg daily, please have pt increase to 8mg alt with 7mg every other day as she was on 8mg daily previously - pt/inr 1 week

## 2019-10-11 ENCOUNTER — TELEPHONE (OUTPATIENT)
Dept: INTERNAL MEDICINE CLINIC | Facility: CLINIC | Age: 79
End: 2019-10-11

## 2019-10-11 DIAGNOSIS — E87.5 HYPERKALEMIA: Primary | ICD-10-CM

## 2019-10-11 DIAGNOSIS — E55.9 VITAMIN D DEFICIENCY: ICD-10-CM

## 2019-10-11 DIAGNOSIS — K21.9 GASTROESOPHAGEAL REFLUX DISEASE, ESOPHAGITIS PRESENCE NOT SPECIFIED: ICD-10-CM

## 2019-10-11 NOTE — TELEPHONE ENCOUNTER
Needs refill on potassium chloride, omeprazole 20 mg, Vitamin d capsule sent to [facility]

## 2019-10-15 RX ORDER — ERGOCALCIFEROL (VITAMIN D2) 1250 MCG
50000 CAPSULE ORAL WEEKLY
Qty: 4 CAPSULE | Refills: 1 | Status: SHIPPED | OUTPATIENT
Start: 2019-10-15 | End: 2020-01-17

## 2019-10-15 RX ORDER — NICOTINE POLACRILEX 4 MG/1
20 GUM, CHEWING ORAL DAILY
Qty: 90 EACH | Refills: 1 | Status: SHIPPED | OUTPATIENT
Start: 2019-10-15

## 2019-10-15 RX ORDER — POTASSIUM CHLORIDE 750 MG/1
10 TABLET, EXTENDED RELEASE ORAL 3 TIMES WEEKLY
Qty: 39 TABLET | Refills: 1 | Status: SHIPPED | OUTPATIENT
Start: 2019-10-16

## 2019-10-15 NOTE — TELEPHONE ENCOUNTER
Dr Delmy Coughlin, is the patient still taking these medications? If so, do we still prescribe them to her? I know there were many medication changes when she was inpatient, but I can't tell from her chart if these are to remain active for her  Thank you

## 2019-10-21 ENCOUNTER — APPOINTMENT (OUTPATIENT)
Dept: LAB | Age: 79
End: 2019-10-21
Payer: MEDICARE

## 2019-10-21 ENCOUNTER — ANTICOAG VISIT (OUTPATIENT)
Dept: INTERNAL MEDICINE CLINIC | Facility: CLINIC | Age: 79
End: 2019-10-21

## 2019-10-21 NOTE — TELEPHONE ENCOUNTER
Visiting Nurse Olivia Sutherland stated patient is still taking medication Potassium on M, W, and F  Also still taking Vitamin d 50,000 weekly and omeprazole daily  Please refill

## 2019-10-28 ENCOUNTER — APPOINTMENT (OUTPATIENT)
Dept: LAB | Age: 79
End: 2019-10-28
Payer: MEDICARE

## 2019-10-28 ENCOUNTER — ANTICOAG VISIT (OUTPATIENT)
Dept: INTERNAL MEDICINE CLINIC | Facility: CLINIC | Age: 79
End: 2019-10-28

## 2019-11-11 NOTE — PROGRESS NOTES
Left message on Home Phone: [phone] requesting a call back to discuss her Coumadin, INR scheduling and her decision on Eliquis  Her Mobile: [phone] is not accepting calls  Daughter Rocky Mckinnon - [phone] will try to make contact with her mother and ask her to call me  Rocky Mckinnon again has found meds in the pill boxes that have been missed or not taken

## 2019-11-12 ENCOUNTER — TELEPHONE (OUTPATIENT)
Dept: INTERNAL MEDICINE CLINIC | Facility: CLINIC | Age: 79
End: 2019-11-12

## 2019-11-12 DIAGNOSIS — I82.403 ACUTE DEEP VEIN THROMBOSIS (DVT) OF BOTH LOWER EXTREMITIES, UNSPECIFIED VEIN (HCC): Primary | ICD-10-CM

## 2019-11-12 DIAGNOSIS — I26.99 PULMONARY EMBOLISM WITHOUT ACUTE COR PULMONALE, UNSPECIFIED CHRONICITY, UNSPECIFIED PULMONARY EMBOLISM TYPE (HCC): ICD-10-CM

## 2019-11-12 NOTE — TELEPHONE ENCOUNTER
Chaya Morfin called  She has decided that she would like to go back on Eliquis  She has worked with the pharmacist at [facility] to make this drug affordable  Her INR's have been fluctuating greatly on Coumadin due to her non compliance with taking the med daily  Please place order and send it to [facility]

## 2019-11-25 ENCOUNTER — TELEPHONE (OUTPATIENT)
Dept: INTERNAL MEDICINE CLINIC | Age: 79
End: 2019-11-25

## 2022-03-03 NOTE — MISCELLANEOUS
1 15 21 EYLEA AND REPEAT EVERY 5 WKS X 2 - MILD EDEMA AT 5 WKS - RETX KEEP 5 WK TO TRY AND CLEAR. Message   Recorded as Task   Date: 07/03/2017 11:03 AM, Created By: Leopold Arab   Task Name: Call Back   Assigned To: Marek Rodriguez   Regarding Patient: Tevin Castaneda, Status: Active   CommentDasie Krystian - 03 Jul 2017 11:03 AM     TASK CREATED  Caller: Self; Care Coordination; [phone] (Home)  Patient called stating that she will be having oral surgery later this week and wants guidance on Eliquis  She will be having a tooth removed and jaw bone checked for infection  She has been taking antibiotics since Friday, 6/30/17 and has not taken Eliquis since Friday, 6/30/17 in the morning, in prep for said oral surgery  She is seeing Dr Nata Francis, [phone] (cell: [phone])  She does not have a date for the oral surgery but anticipates it to be later this week  Please contact patient to review Eliquis and dosage  She can be reached at home number  Thank you  Mami Grayson - 03 Jul 2017 2:51 PM     TASK REPLIED TO: Previously Assigned To [facility] Clinical,team  She should only hold Eliquis for 2 days before the procedure and can start it back the day afterwards  She should be back on it until she knows when the surgery is   Ioana Castillo - 03 Jul 2017 3:10 PM     TASK REASSIGNED: Previously Assigned To Jose Daniel Quan - 03 Jul 2017 3:29 PM     TASK EDITED  Called patient with the instructions  She read them back to me and stated that she understood clearly  Active Problems    1  Actinic keratoses (702 0) (L57 0)   2  Benign essential hypertension (401 1) (I10)   3  Dilated cardiomyopathy (425 4) (I42 0)   4  Hypercholesterolemia (272 0) (E78 00)   5  Multiple myeloma (203 00) (C90 00)   6  Osteoporosis (733 00) (M81 0)   7  Pulmonary embolism (415 19) (I26 99)    Current Meds   1  Acyclovir 400 MG Oral Tablet; Take 1 tablet daily; Therapy: 50FUH9687 to (Evaluate:21Oct2015) Recorded   2   Carvedilol 6 25 MG Oral Tablet; TAKE 1 TABLET TWICE DAILY WITH MEALS  Requested   for: 99REG3397; Last Rx:[id number] Ordered   3  Dexamethasone 4 MG Oral Tablet; 5 tablets weekly on cho day; Therapy: 79Gxl6624 to Recorded   4  Eliquis 5 MG Oral Tablet; Take 1 tablet twice daily; Therapy: 59NYL3284 to (Evaluate:16Mar2017)  Requested for: 96AYK3877; Last   Rx:[id number] Ordered   5  Famotidine 20 MG Oral Tablet; 1 tablet daily if needed; Therapy: 46Hpk5032 to Recorded   6  Furosemide 20 MG Oral Tablet; TAKE 1 TABLET DAILY AS DIRECTED; Therapy: (Recorded:49Paa4548) to Recorded   7  Klor-Con M20 20 MEQ Oral Tablet Extended Release; TAKE 1 TABLET DAILY    Requested for: 06UCB8528; Last Rx:18May2015 Ordered   8  Lisinopril 5 MG Oral Tablet; take 1 tablet daily at bedtime; Last Rx:12Jan2015 Ordered   9  LORazepam 0 5 MG Oral Tablet; TAKE 1 TABLET EVERY 6 HOURS AS NEEDED FOR   NAUSEA; Therapy: (Recorded:06Oct2015) to Recorded   10  Ondansetron 8 MG Oral Tablet Disintegrating; Therapy: 83Sds9006 to Recorded   11  Prochlorperazine Maleate 10 MG Oral Tablet; Therapy: 70Qvv3417 to Recorded   12  Revlimid 15 MG Oral Capsule; TAKE 1 CAPSULE DAILY; Therapy: 75XPZ1294 to Recorded   13  Simvastatin 40 MG Oral Tablet; TAKE 1 TABLET DAILY  Requested for: 58RCN0334; Last    Rx:[id number] Ordered   14  Velcade 3 5 MG Injection Solution Reconstituted; weekly; Therapy: (Recorded:10Nov2016) to Recorded   15  Vitamin D3 61698 UNIT Oral Capsule; Take 1 capsule, 1 x per week, for 12 weeks; Therapy: 67Khf2994 to (Last Rx:45Qpc4960)  Requested for: 55PIL2069 Ordered   16  Zometa 4 MG/100ML Intravenous Solution (Zoledronic Acid); monthly; Therapy: (Recorded:56Rcb2508) to Recorded    Allergies    1  Latex Gloves MISC    2  Latex   3   Adhesive Tape    Signatures   Electronically signed by : Valerie Barfield RN; Jul  3 2017  3:30PM EST                       (Author)

## 2025-01-17 NOTE — TELEPHONE ENCOUNTER
Needs refill on zocor 40 mg daily, uses [facility] pt.  Advised pt of the same.  Pt has MRI scheduled for 1/20.  Pt verbalized understanding.